# Patient Record
Sex: MALE | Race: OTHER | Employment: OTHER | URBAN - METROPOLITAN AREA
[De-identification: names, ages, dates, MRNs, and addresses within clinical notes are randomized per-mention and may not be internally consistent; named-entity substitution may affect disease eponyms.]

---

## 2017-01-14 PROCEDURE — 84165 PROTEIN E-PHORESIS SERUM: CPT | Performed by: INTERNAL MEDICINE

## 2017-01-14 PROCEDURE — 86334 IMMUNOFIX E-PHORESIS SERUM: CPT | Performed by: INTERNAL MEDICINE

## 2017-01-14 PROCEDURE — 83883 ASSAY NEPHELOMETRY NOT SPEC: CPT | Performed by: INTERNAL MEDICINE

## 2017-01-14 PROCEDURE — 36415 COLL VENOUS BLD VENIPUNCTURE: CPT | Performed by: INTERNAL MEDICINE

## 2017-06-22 PROBLEM — M17.12 PRIMARY OSTEOARTHRITIS OF LEFT KNEE: Status: ACTIVE | Noted: 2017-06-22

## 2017-06-22 PROBLEM — M17.11 PRIMARY OSTEOARTHRITIS OF RIGHT KNEE: Status: ACTIVE | Noted: 2017-06-22

## 2017-08-07 ENCOUNTER — HOSPITAL ENCOUNTER (OUTPATIENT)
Dept: PHYSICAL THERAPY | Facility: HOSPITAL | Age: 77
Discharge: HOME | End: 2017-08-07
Attending: ORTHOPAEDIC SURGERY
Payer: MEDICARE

## 2017-08-07 ENCOUNTER — LABORATORY ENCOUNTER (OUTPATIENT)
Dept: LAB | Facility: HOSPITAL | Age: 77
End: 2017-08-07
Attending: ORTHOPAEDIC SURGERY
Payer: MEDICARE

## 2017-08-07 DIAGNOSIS — Z01.818 PREOP TESTING: ICD-10-CM

## 2017-08-07 LAB
ANTIBODY SCREEN: NEGATIVE
BASOPHILS # BLD AUTO: 0.06 X10(3) UL (ref 0–0.1)
BASOPHILS NFR BLD AUTO: 0.8 %
BUN BLD-MCNC: 11 MG/DL (ref 8–20)
CALCIUM BLD-MCNC: 10.1 MG/DL (ref 8.3–10.3)
CHLORIDE: 105 MMOL/L (ref 101–111)
CO2: 25 MMOL/L (ref 22–32)
CREAT BLD-MCNC: 0.98 MG/DL (ref 0.7–1.3)
EOSINOPHIL # BLD AUTO: 0.24 X10(3) UL (ref 0–0.3)
EOSINOPHIL NFR BLD AUTO: 3.3 %
ERYTHROCYTE [DISTWIDTH] IN BLOOD BY AUTOMATED COUNT: 12.9 % (ref 11.5–16)
GLUCOSE BLD-MCNC: 89 MG/DL (ref 70–99)
HCT VFR BLD AUTO: 42.4 % (ref 37–53)
HGB BLD-MCNC: 14 G/DL (ref 13–17)
IMMATURE GRANULOCYTE COUNT: 0.01 X10(3) UL (ref 0–1)
IMMATURE GRANULOCYTE RATIO %: 0.1 %
LYMPHOCYTES # BLD AUTO: 2.19 X10(3) UL (ref 0.9–4)
LYMPHOCYTES NFR BLD AUTO: 30.5 %
MCH RBC QN AUTO: 28.4 PG (ref 27–33.2)
MCHC RBC AUTO-ENTMCNC: 33 G/DL (ref 31–37)
MCV RBC AUTO: 86 FL (ref 80–99)
MONOCYTES # BLD AUTO: 0.64 X10(3) UL (ref 0.1–0.6)
MONOCYTES NFR BLD AUTO: 8.9 %
NEUTROPHIL ABS PRELIM: 4.03 X10 (3) UL (ref 1.3–6.7)
NEUTROPHILS # BLD AUTO: 4.03 X10(3) UL (ref 1.3–6.7)
NEUTROPHILS NFR BLD AUTO: 56.4 %
PLATELET # BLD AUTO: 338 10(3)UL (ref 150–450)
POTASSIUM SERPL-SCNC: 4.2 MMOL/L (ref 3.6–5.1)
RBC # BLD AUTO: 4.93 X10(6)UL (ref 3.8–5.8)
RED CELL DISTRIBUTION WIDTH-SD: 40.4 FL (ref 35.1–46.3)
RH BLOOD TYPE: POSITIVE
SODIUM SERPL-SCNC: 138 MMOL/L (ref 136–144)
WBC # BLD AUTO: 7.2 X10(3) UL (ref 4–13)

## 2017-08-07 PROCEDURE — 36415 COLL VENOUS BLD VENIPUNCTURE: CPT

## 2017-08-07 PROCEDURE — 86901 BLOOD TYPING SEROLOGIC RH(D): CPT

## 2017-08-07 PROCEDURE — 85025 COMPLETE CBC W/AUTO DIFF WBC: CPT

## 2017-08-07 PROCEDURE — 87081 CULTURE SCREEN ONLY: CPT

## 2017-08-07 PROCEDURE — 86850 RBC ANTIBODY SCREEN: CPT

## 2017-08-07 PROCEDURE — 86900 BLOOD TYPING SEROLOGIC ABO: CPT

## 2017-08-07 PROCEDURE — 80048 BASIC METABOLIC PNL TOTAL CA: CPT

## 2017-08-08 PROCEDURE — 83883 ASSAY NEPHELOMETRY NOT SPEC: CPT | Performed by: INTERNAL MEDICINE

## 2017-08-08 PROCEDURE — 84165 PROTEIN E-PHORESIS SERUM: CPT | Performed by: INTERNAL MEDICINE

## 2017-08-08 PROCEDURE — 86334 IMMUNOFIX E-PHORESIS SERUM: CPT | Performed by: INTERNAL MEDICINE

## 2017-09-07 NOTE — OR NURSING
Spoke to Berenice in Dr. Sven Frey office regarding MRSA culture was done 8/7/17 and is over the 30 days and she stated Dr. Alice Ahmadi would like for it to be repeated prior to surgery on 10/11/17.

## 2017-09-21 ENCOUNTER — ANESTHESIA EVENT (OUTPATIENT)
Dept: SURGERY | Facility: HOSPITAL | Age: 77
DRG: 470 | End: 2017-09-21
Payer: MEDICARE

## 2017-10-04 ENCOUNTER — LABORATORY ENCOUNTER (OUTPATIENT)
Dept: LAB | Facility: HOSPITAL | Age: 77
End: 2017-10-04
Attending: ORTHOPAEDIC SURGERY
Payer: MEDICARE

## 2017-10-04 DIAGNOSIS — M17.12 PRIMARY OSTEOARTHRITIS OF LEFT KNEE: ICD-10-CM

## 2017-10-04 PROCEDURE — 86850 RBC ANTIBODY SCREEN: CPT

## 2017-10-04 PROCEDURE — 87081 CULTURE SCREEN ONLY: CPT

## 2017-10-04 PROCEDURE — 36415 COLL VENOUS BLD VENIPUNCTURE: CPT

## 2017-10-04 PROCEDURE — 86900 BLOOD TYPING SEROLOGIC ABO: CPT

## 2017-10-04 PROCEDURE — 85730 THROMBOPLASTIN TIME PARTIAL: CPT

## 2017-10-04 PROCEDURE — 86901 BLOOD TYPING SEROLOGIC RH(D): CPT

## 2017-10-04 PROCEDURE — 80076 HEPATIC FUNCTION PANEL: CPT

## 2017-10-04 PROCEDURE — 85610 PROTHROMBIN TIME: CPT

## 2017-10-11 ENCOUNTER — HOSPITAL ENCOUNTER (INPATIENT)
Facility: HOSPITAL | Age: 77
LOS: 2 days | Discharge: HOME HEALTH | DRG: 470 | End: 2017-10-13
Attending: ORTHOPAEDIC SURGERY | Admitting: ORTHOPAEDIC SURGERY
Payer: MEDICARE

## 2017-10-11 ENCOUNTER — SURGERY (OUTPATIENT)
Age: 77
End: 2017-10-11

## 2017-10-11 ENCOUNTER — ANESTHESIA (OUTPATIENT)
Dept: SURGERY | Facility: HOSPITAL | Age: 77
DRG: 470 | End: 2017-10-11
Payer: MEDICARE

## 2017-10-11 ENCOUNTER — APPOINTMENT (OUTPATIENT)
Dept: GENERAL RADIOLOGY | Facility: HOSPITAL | Age: 77
DRG: 470 | End: 2017-10-11
Attending: PHYSICIAN ASSISTANT
Payer: MEDICARE

## 2017-10-11 DIAGNOSIS — M17.12 PRIMARY OSTEOARTHRITIS OF LEFT KNEE: Primary | ICD-10-CM

## 2017-10-11 PROCEDURE — 76942 ECHO GUIDE FOR BIOPSY: CPT | Performed by: ORTHOPAEDIC SURGERY

## 2017-10-11 PROCEDURE — 85730 THROMBOPLASTIN TIME PARTIAL: CPT

## 2017-10-11 PROCEDURE — 3E0T3BZ INTRODUCTION OF ANESTHETIC AGENT INTO PERIPHERAL NERVES AND PLEXI, PERCUTANEOUS APPROACH: ICD-10-PCS | Performed by: ANESTHESIOLOGY

## 2017-10-11 PROCEDURE — 0SRD0J9 REPLACEMENT OF LEFT KNEE JOINT WITH SYNTHETIC SUBSTITUTE, CEMENTED, OPEN APPROACH: ICD-10-PCS | Performed by: ORTHOPAEDIC SURGERY

## 2017-10-11 PROCEDURE — 88305 TISSUE EXAM BY PATHOLOGIST: CPT | Performed by: ORTHOPAEDIC SURGERY

## 2017-10-11 PROCEDURE — 73560 X-RAY EXAM OF KNEE 1 OR 2: CPT | Performed by: PHYSICIAN ASSISTANT

## 2017-10-11 PROCEDURE — 88311 DECALCIFY TISSUE: CPT | Performed by: ORTHOPAEDIC SURGERY

## 2017-10-11 DEVICE — ATTUNE KNEE SYSTEM TIBIAL INSERT ROTATING PLATFORM POSTERIOR STABILIZED 5 6MM AOX
Type: IMPLANTABLE DEVICE | Site: KNEE | Status: FUNCTIONAL
Brand: ATTUNE

## 2017-10-11 DEVICE — ATTUNE KNEE SYSTEM TIBIAL BASE ROTATING PLATFORM SIZE 4 CEMENTED
Type: IMPLANTABLE DEVICE | Site: KNEE | Status: FUNCTIONAL
Brand: ATTUNE

## 2017-10-11 DEVICE — ATTUNE PINNING SYSTEM
Type: IMPLANTABLE DEVICE | Site: KNEE | Status: FUNCTIONAL
Brand: ATTUNE

## 2017-10-11 DEVICE — CEMENT BONE RADIOPAQ HOWMEDICA: Type: IMPLANTABLE DEVICE | Site: KNEE | Status: FUNCTIONAL

## 2017-10-11 DEVICE — ATTUNE KNEE SYSTEM FEMORAL POSTERIOR STABILIZED SIZE 5 LEFT CEMENTED
Type: IMPLANTABLE DEVICE | Site: KNEE | Status: FUNCTIONAL
Brand: ATTUNE

## 2017-10-11 RX ORDER — OXYCODONE HYDROCHLORIDE 10 MG/1
10 TABLET ORAL EVERY 4 HOURS PRN
Status: DISPENSED | OUTPATIENT
Start: 2017-10-11 | End: 2017-10-13

## 2017-10-11 RX ORDER — ACETAMINOPHEN 325 MG/1
TABLET ORAL
Status: COMPLETED
Start: 2017-10-11 | End: 2017-10-11

## 2017-10-11 RX ORDER — HYDROMORPHONE HYDROCHLORIDE 1 MG/ML
0.8 INJECTION, SOLUTION INTRAMUSCULAR; INTRAVENOUS; SUBCUTANEOUS EVERY 2 HOUR PRN
Status: ACTIVE | OUTPATIENT
Start: 2017-10-11 | End: 2017-10-13

## 2017-10-11 RX ORDER — CETIRIZINE HYDROCHLORIDE 10 MG/1
10 TABLET ORAL EVERY EVENING
Status: DISCONTINUED | OUTPATIENT
Start: 2017-10-11 | End: 2017-10-13

## 2017-10-11 RX ORDER — CELECOXIB 200 MG/1
200 CAPSULE ORAL DAILY
Qty: 30 CAPSULE | Refills: 0 | Status: SHIPPED | OUTPATIENT
Start: 2017-10-11 | End: 2018-06-12

## 2017-10-11 RX ORDER — BUPIVACAINE HYDROCHLORIDE AND EPINEPHRINE 2.5; 5 MG/ML; UG/ML
INJECTION, SOLUTION EPIDURAL; INFILTRATION; INTRACAUDAL; PERINEURAL AS NEEDED
Status: DISCONTINUED | OUTPATIENT
Start: 2017-10-11 | End: 2017-10-11 | Stop reason: HOSPADM

## 2017-10-11 RX ORDER — SENNOSIDES 8.6 MG
17.2 TABLET ORAL NIGHTLY
Status: DISCONTINUED | OUTPATIENT
Start: 2017-10-11 | End: 2017-10-13

## 2017-10-11 RX ORDER — OXYCODONE HYDROCHLORIDE 5 MG/1
5 TABLET ORAL EVERY 4 HOURS PRN
Status: DISPENSED | OUTPATIENT
Start: 2017-10-11 | End: 2017-10-13

## 2017-10-11 RX ORDER — MEPERIDINE HYDROCHLORIDE 25 MG/ML
12.5 INJECTION INTRAMUSCULAR; INTRAVENOUS; SUBCUTANEOUS AS NEEDED
Status: DISCONTINUED | OUTPATIENT
Start: 2017-10-11 | End: 2017-10-11 | Stop reason: HOSPADM

## 2017-10-11 RX ORDER — METOCLOPRAMIDE HYDROCHLORIDE 5 MG/ML
10 INJECTION INTRAMUSCULAR; INTRAVENOUS EVERY 6 HOURS PRN
Status: ACTIVE | OUTPATIENT
Start: 2017-10-11 | End: 2017-10-13

## 2017-10-11 RX ORDER — CYCLOBENZAPRINE HCL 5 MG
5 TABLET ORAL 3 TIMES DAILY PRN
Status: DISCONTINUED | OUTPATIENT
Start: 2017-10-11 | End: 2017-10-13

## 2017-10-11 RX ORDER — HYDROMORPHONE HYDROCHLORIDE 1 MG/ML
0.4 INJECTION, SOLUTION INTRAMUSCULAR; INTRAVENOUS; SUBCUTANEOUS EVERY 5 MIN PRN
Status: DISCONTINUED | OUTPATIENT
Start: 2017-10-11 | End: 2017-10-11 | Stop reason: HOSPADM

## 2017-10-11 RX ORDER — DIPHENHYDRAMINE HYDROCHLORIDE 50 MG/ML
25 INJECTION INTRAMUSCULAR; INTRAVENOUS ONCE AS NEEDED
Status: ACTIVE | OUTPATIENT
Start: 2017-10-11 | End: 2017-10-11

## 2017-10-11 RX ORDER — ONDANSETRON 2 MG/ML
4 INJECTION INTRAMUSCULAR; INTRAVENOUS AS NEEDED
Status: DISCONTINUED | OUTPATIENT
Start: 2017-10-11 | End: 2017-10-11 | Stop reason: HOSPADM

## 2017-10-11 RX ORDER — MIDAZOLAM HYDROCHLORIDE 1 MG/ML
1 INJECTION INTRAMUSCULAR; INTRAVENOUS EVERY 5 MIN PRN
Status: DISCONTINUED | OUTPATIENT
Start: 2017-10-11 | End: 2017-10-11 | Stop reason: HOSPADM

## 2017-10-11 RX ORDER — ALENDRONATE SODIUM 70 MG/1
70 TABLET ORAL WEEKLY
COMMUNITY
End: 2017-10-13

## 2017-10-11 RX ORDER — POLYETHYLENE GLYCOL 3350 17 G/17G
17 POWDER, FOR SOLUTION ORAL DAILY PRN
Status: DISCONTINUED | OUTPATIENT
Start: 2017-10-11 | End: 2017-10-13

## 2017-10-11 RX ORDER — SODIUM PHOSPHATE, DIBASIC AND SODIUM PHOSPHATE, MONOBASIC 7; 19 G/133ML; G/133ML
1 ENEMA RECTAL ONCE AS NEEDED
Status: DISCONTINUED | OUTPATIENT
Start: 2017-10-11 | End: 2017-10-13

## 2017-10-11 RX ORDER — OXYCODONE HYDROCHLORIDE AND ACETAMINOPHEN 5; 325 MG/1; MG/1
1 TABLET ORAL EVERY 4 HOURS PRN
Qty: 80 TABLET | Refills: 0 | Status: SHIPPED | OUTPATIENT
Start: 2017-10-11 | End: 2017-10-23

## 2017-10-11 RX ORDER — DIPHENHYDRAMINE HYDROCHLORIDE 50 MG/ML
12.5 INJECTION INTRAMUSCULAR; INTRAVENOUS EVERY 4 HOURS PRN
Status: DISCONTINUED | OUTPATIENT
Start: 2017-10-11 | End: 2017-10-13

## 2017-10-11 RX ORDER — SODIUM CHLORIDE, SODIUM LACTATE, POTASSIUM CHLORIDE, CALCIUM CHLORIDE 600; 310; 30; 20 MG/100ML; MG/100ML; MG/100ML; MG/100ML
INJECTION, SOLUTION INTRAVENOUS CONTINUOUS
Status: DISCONTINUED | OUTPATIENT
Start: 2017-10-11 | End: 2017-10-13

## 2017-10-11 RX ORDER — ONDANSETRON 2 MG/ML
4 INJECTION INTRAMUSCULAR; INTRAVENOUS EVERY 4 HOURS PRN
Status: DISCONTINUED | OUTPATIENT
Start: 2017-10-11 | End: 2017-10-13

## 2017-10-11 RX ORDER — CEFAZOLIN SODIUM 1 G/3ML
INJECTION, POWDER, FOR SOLUTION INTRAMUSCULAR; INTRAVENOUS
Status: DISCONTINUED | OUTPATIENT
Start: 2017-10-11 | End: 2017-10-11 | Stop reason: HOSPADM

## 2017-10-11 RX ORDER — DOCUSATE SODIUM 100 MG/1
100 CAPSULE, LIQUID FILLED ORAL 2 TIMES DAILY
Qty: 60 CAPSULE | Refills: 0 | Status: SHIPPED | OUTPATIENT
Start: 2017-10-11 | End: 2017-11-10 | Stop reason: ALTCHOICE

## 2017-10-11 RX ORDER — HYDROMORPHONE HYDROCHLORIDE 1 MG/ML
0.4 INJECTION, SOLUTION INTRAMUSCULAR; INTRAVENOUS; SUBCUTANEOUS EVERY 2 HOUR PRN
Status: DISPENSED | OUTPATIENT
Start: 2017-10-11 | End: 2017-10-13

## 2017-10-11 RX ORDER — HYDROXYZINE PAMOATE 25 MG/1
25 CAPSULE ORAL 3 TIMES DAILY PRN
Qty: 60 CAPSULE | Refills: 0 | Status: SHIPPED | OUTPATIENT
Start: 2017-10-11 | End: 2018-07-09

## 2017-10-11 RX ORDER — ACETAMINOPHEN 325 MG/1
650 TABLET ORAL 4 TIMES DAILY
Status: DISPENSED | OUTPATIENT
Start: 2017-10-11 | End: 2017-10-13

## 2017-10-11 RX ORDER — DIPHENHYDRAMINE HCL 25 MG
25 CAPSULE ORAL EVERY 4 HOURS PRN
Status: DISCONTINUED | OUTPATIENT
Start: 2017-10-11 | End: 2017-10-13

## 2017-10-11 RX ORDER — MELATONIN
325
Status: DISCONTINUED | OUTPATIENT
Start: 2017-10-12 | End: 2017-10-13

## 2017-10-11 RX ORDER — OXYCODONE HCL 10 MG/1
10 TABLET, FILM COATED, EXTENDED RELEASE ORAL
Status: COMPLETED | OUTPATIENT
Start: 2017-10-11 | End: 2017-10-11

## 2017-10-11 RX ORDER — OXYCODONE HYDROCHLORIDE 15 MG/1
15 TABLET ORAL EVERY 4 HOURS PRN
Status: DISPENSED | OUTPATIENT
Start: 2017-10-11 | End: 2017-10-13

## 2017-10-11 RX ORDER — METOCLOPRAMIDE HYDROCHLORIDE 5 MG/ML
10 INJECTION INTRAMUSCULAR; INTRAVENOUS AS NEEDED
Status: DISCONTINUED | OUTPATIENT
Start: 2017-10-11 | End: 2017-10-11 | Stop reason: HOSPADM

## 2017-10-11 RX ORDER — SODIUM CHLORIDE 9 MG/ML
INJECTION, SOLUTION INTRAVENOUS CONTINUOUS
Status: DISCONTINUED | OUTPATIENT
Start: 2017-10-11 | End: 2017-10-13

## 2017-10-11 RX ORDER — ASPIRIN 81 MG/1
81 TABLET ORAL DAILY
Status: DISCONTINUED | OUTPATIENT
Start: 2017-10-12 | End: 2017-10-12

## 2017-10-11 RX ORDER — DOCUSATE SODIUM 100 MG/1
100 CAPSULE, LIQUID FILLED ORAL 2 TIMES DAILY
Status: DISCONTINUED | OUTPATIENT
Start: 2017-10-11 | End: 2017-10-13

## 2017-10-11 RX ORDER — NALOXONE HYDROCHLORIDE 0.4 MG/ML
80 INJECTION, SOLUTION INTRAMUSCULAR; INTRAVENOUS; SUBCUTANEOUS AS NEEDED
Status: DISCONTINUED | OUTPATIENT
Start: 2017-10-11 | End: 2017-10-11 | Stop reason: HOSPADM

## 2017-10-11 RX ORDER — BISACODYL 10 MG
10 SUPPOSITORY, RECTAL RECTAL
Status: DISCONTINUED | OUTPATIENT
Start: 2017-10-11 | End: 2017-10-13

## 2017-10-11 RX ORDER — HYDROMORPHONE HYDROCHLORIDE 1 MG/ML
0.2 INJECTION, SOLUTION INTRAMUSCULAR; INTRAVENOUS; SUBCUTANEOUS EVERY 2 HOUR PRN
Status: DISPENSED | OUTPATIENT
Start: 2017-10-11 | End: 2017-10-13

## 2017-10-11 RX ORDER — ACETAMINOPHEN 325 MG/1
650 TABLET ORAL ONCE
Status: COMPLETED | OUTPATIENT
Start: 2017-10-11 | End: 2017-10-11

## 2017-10-11 NOTE — CONSULTS
[facility] Hospitalist H&P       CC: medicine consult for comanagement of medical conditions     PCP: Elba Lam MD    History of Present Illness: Patient is a 77year old male with PMH sig for HTN, HL, IFG and OA is admitted for planned L TKR.   [facility]  No date: HEMORRHOIDECTOMY  No date: HERNIA SURGERY      Comment: Bilateral  4/26/2011: NASAL SCOPY,OPEN MAXILL SINUS      Comment: Performed by Alexsandra Diaz at [address] [place] [address],[address]  4/26/2011: [address] daily. Disp:  Rfl:    AmLODIPine Besylate 5 MG Oral Tab Take 1 tablet (5 mg total) by mouth daily. Disp: 90 tablet Rfl: 3   [DISCONTINUED] ALENDRONATE SODIUM 70 MG Oral Tab TAKE 1 TABLET BY MOUTH EVERY 7 DAYS.  TAKE WITH FULL GLASS OF WATER AND REMAIN Tray Fears hours.    Invalid input(s): LYM#, MONO#, BASOS#, EOSIN#    No results for input(s): NA, K, CL, CO2, BUN, CREATSERUM, GLU, CA, CAION, MG, PHOS in the last 72 hours. No results for input(s): ALT, AST, ALB, AMYLASE, LIPASE, LDH in the last 72 hours.     Inv Hospitalist  Pager [phone]  Answering Service number: [phone]

## 2017-10-11 NOTE — PLAN OF CARE
Patient/Family Long Term Goal Progressing      Patient/Family Short Term Goal Progressing      Incision(s), wounds(s) or drain site(s) healing without S/S of infection Progressing      Patient received from PACU at 12:45  Son Erick Hernandez at bedside    Emily Peck

## 2017-10-11 NOTE — H&P
Office Visit     8/8/2017  [facility] INTERNAL MEDICINE - Shawn Agustin MD   Internal Medicine   Preoperative examination +4 more   Dx   Pre-Op Exam    Reason for Visit    Reason for Visit     Pre-Op Exam left knee surgery on 8/21/2017 at E Location: [address] [place] [address]  7/11/2015: Marlee Perry N/A      Comment: Procedure: COLONOSCOPY, POSSIBLE BIOPSY,                POSSIBLE POLYPECTOMY 20256;  Surgeon: Yohana Rosario MD;  Location: [facility] No Known Allergies  Current Meds:     Current Outpatient Prescriptions:  apixaban 2.5 MG Oral Tab Take 1 tablet (2.5 mg total) by mouth 2 (two) times daily. Disp: 24 tablet Rfl: 0   ALENDRONATE SODIUM 70 MG Oral Tab TAKE 1 TABLET BY MOUTH EVERY 7 DAYS.  PREM 80.0 - 99.0 fL 86.0   MCH      27.0 - 33.2 pg 28.4   MCHC      31.0 - 37.0 g/dL 33.0   RDW      11.5 - 16.0 % 12.9   RDW-SD      35.1 - 46.3 fL 40.4   Prelim Neutrophil Abs      1.30 - 6.70 x10 (3) uL 4.03   Neutrophils Absolute      1.30 - 6.70 x10(3) - METS>4  - normal ECG  - low risk candidate for surgery, noted elevated protein level, will need evaluation prior to surgery.  Note sent to Dr. Theodora Rehman  - ELECTROCARDIOGRAM, COMPLETE  - OFFICE CONSULTATION,LEVEL IV     2. Osteoarthritis of both knees, unsp

## 2017-10-11 NOTE — H&P
[address] Patient Status:  Surgery Admit    1940 MRN [id number]   Location [address] [place] [address] Attending Arnie Nicholas MD   [facility] Day # 0 PCP Ambrosio Johansen MD     History of Present Illn LLC  4/26/2011: EXCISION TURBINATE,SUBMUCOUS      Comment: Performed by Joshua Gamez at [address]  No date: HEMORRHOIDECTOMY  No date: HERNIA SURGERY      Comment: Bilateral  4/26/2011: [address] [facility] 9/27/2017   triamcinolone acetonide 0.1 % External Cream Apply topically 2 (two) times daily.  Disp: 453.5 g Rfl: 1 10/8/2017   Levocetirizine Dihydrochloride 5 MG Oral Tab Take 1 tab daily Disp: 30 tablet Rfl: 2 9/27/2017       Review of Systems:  A compre

## 2017-10-11 NOTE — ANESTHESIA POSTPROCEDURE EVALUATION
[address] [facility] Patient Status:  Surgery Admit   Age/Gender 77year old male MRN [id number]   Location [address] [facility] Attending Mc Pelaez MD   Hosp Day # 0 PCP Nadine Jackson MD       Anesthesia Post-o

## 2017-10-11 NOTE — ANESTHESIA PREPROCEDURE EVALUATION
PRE-OP EVALUATION    Patient Name: Raegan Serna    Pre-op Diagnosis: LEFT KNEE OSTEOARTHRITIS    Procedure(s):  LEFT TOTAL KNEE REPLACEMENT    Surgeon(s) and Role:     Jenna Carreon MD - Primary    Pre-op vitals reviewed. Body mass index is 22. 32 Procedure: COLONOSCOPY, POSSIBLE BIOPSY,                POSSIBLE POLYPECTOMY 33149;  Surgeon: Catherine Agarwal MD;  Location: [address] [place] [address]  4/26/2011: EXCISION TURBINATE,SUBMUCOUS      Comment: Performed by Baldemar Michaels  08/07/2017   CO2 25.0 08/07/2017   BUN 11 08/07/2017   CREATSERUM 0.98 08/07/2017   GLU 89 08/07/2017   CA 10.1 08/07/2017       Lab Results  Component Value Date   INR 0.98 10/04/2017         Airway      Mallampati: I  Mouth opening: 3 FB  TM dis

## 2017-10-11 NOTE — PHYSICAL THERAPY NOTE
Order received for PT eval. Attempted to see Pt x 2 this afternoon, however, Pt continues to present with L LE numbness. Pt not currently appropriate for therapy. Will follow up 10/12/17.

## 2017-10-12 PROCEDURE — 85027 COMPLETE CBC AUTOMATED: CPT | Performed by: ORTHOPAEDIC SURGERY

## 2017-10-12 PROCEDURE — 97535 SELF CARE MNGMENT TRAINING: CPT

## 2017-10-12 PROCEDURE — 97165 OT EVAL LOW COMPLEX 30 MIN: CPT

## 2017-10-12 PROCEDURE — 97150 GROUP THERAPEUTIC PROCEDURES: CPT

## 2017-10-12 PROCEDURE — 97116 GAIT TRAINING THERAPY: CPT

## 2017-10-12 PROCEDURE — 97162 PT EVAL MOD COMPLEX 30 MIN: CPT

## 2017-10-12 RX ORDER — HYDROCODONE BITARTRATE AND ACETAMINOPHEN 10; 325 MG/1; MG/1
1 TABLET ORAL EVERY 4 HOURS PRN
Status: DISCONTINUED | OUTPATIENT
Start: 2017-10-13 | End: 2017-10-13

## 2017-10-12 RX ORDER — AMLODIPINE BESYLATE 5 MG/1
5 TABLET ORAL DAILY
Status: DISCONTINUED | OUTPATIENT
Start: 2017-10-12 | End: 2017-10-13

## 2017-10-12 RX ORDER — HYDROCODONE BITARTRATE AND ACETAMINOPHEN 10; 325 MG/1; MG/1
2 TABLET ORAL EVERY 4 HOURS PRN
Status: DISCONTINUED | OUTPATIENT
Start: 2017-10-13 | End: 2017-10-13

## 2017-10-12 NOTE — PHYSICAL THERAPY NOTE
PHYSICAL THERAPY KNEE EVALUATION - INPATIENT     Room Number: 364/364-A  Evaluation Date: 10/12/2017  Type of Evaluation: Initial  Physician Order: PT Eval and Treat    Presenting Problem: s/p left TKA 10/11/17  Reason for Therapy: [facility]  No date: HEMORRHOIDECTOMY  No date: HERNIA SURGERY      Comment: Bilateral  4/26/2011: NASAL SCOPY,OPEN MAXILL SINUS      Comment: Performed by Slime Stevenson at [address] [place] [address],[address]  4/26/2011: [address] ASSESSMENT  Ratin (appears higher, pt reluctant to give higher number)  Location: left knee  Management Techniques:  Activity promotion;Repositioning    COGNITION  · Overall Cognitive Status:  WFL - within functional limits    RANGE OF MOTION AND STRENG son present. Pt educated in role of PT, group therapy, ankle pumps, goals for session. Pt transferred sit to stand to rw wbat left with cga, pt stating \"let me do it myself\".   Pt amb into and out of restroom with rw with gait training emphasis on postu of function. DISCHARGE RECOMMENDATIONS  PT Discharge Recommendations: Outpatient PT; Home;24 hour care/supervision    PLAN  PT Treatment Plan: Bed mobility; Endurance; Patient education; Family education;Gait training;Range of motion;Strengthening;Stair train

## 2017-10-12 NOTE — CM/SW NOTE
10/12/17 1600   CM/SW Referral Data   Referral Source Physician   Reason for Referral Discharge planning   Informant Patient;Edward Staff   Pertinent Medical Hx   Primary Care Physician Name DOMINIC Lock   Patient Info   Patient's Mental Status Alert;O Pt will need order for OPPT from MD.

## 2017-10-12 NOTE — PLAN OF CARE
Patient/Family Goals    • Patient/Family Long Term Goal Progressing    • Patient/Family Short Term Goal Progressing        SKIN/TISSUE INTEGRITY - ADULT    • Incision(s), wounds(s) or drain site(s) healing without S/S of infection Progressing        Pt A &

## 2017-10-12 NOTE — PROGRESS NOTES
POD #1 Joint newsletter given to patient and son. Patient speaking/understanding some English but requested son to translate. Reviewed indications, side effects of pain medication/narcotics and constipation prevention.  Stressed importance of increased flui

## 2017-10-12 NOTE — PROGRESS NOTES
Orthopedic surgery progress note    Anyi Duran Patient Status:  Inpatient    1940 MRN [id number]   [facility] 3SW-A Attending Janet Gardner MD   [facility] Day # 1 PCP Maximo Haider MD       Subjective:  No major complaints.   No

## 2017-10-12 NOTE — CERTIFICATION
**Certification    PHYSICIAN Certification of Need for Inpatient Hospitalization    Based on the his current state of illness, Wylisa Faustino requires inpatient hospitalization for his surgery

## 2017-10-12 NOTE — PHYSICAL THERAPY NOTE
PHYSICAL THERAPY KNEE TREATMENT NOTE - INPATIENT     Room Number: 364/364-A     Session: 1 and 2   Number of Visits to Meet Established Goals: 4    Presenting Problem: s/p left TKA 10/11/17    Problem List  Active Problems:    Primary osteoarthritis of le [place]  4/26/2011: NASAL SCOPY,OPEN MAXILL SINUS      Comment: Performed by Lane Arredondo at [address] [place] [address],[address]  4/26/2011: NASAL SCOPY,REMV PART ETHMOID      Comment: Performed by Lane Arredondo at [place] [address] hospital room?: A Little   -   Climbing 3-5 steps with a railing?: A Little       AM-PAC Score:  Raw Score: 18   PT Approx Degree of Impairment Score: 46.58%   Standardized Score (AM-PAC Scale): 43.63   CMS Modifier (G-Code): CK    FUNCTIONAL ABILITY STATU 15    Patient End of Session: Up in chair; With [facility] staff;Needs met    ASSESSMENT     Pt seen BID in PT group for gait training, stair/curb step training, and BLE strengthening: S/P L TKA on 10/11/17. Pt is progressing well.     Results of the AM-PAC \"6 clic

## 2017-10-12 NOTE — PROGRESS NOTES
Operative leg measured for tubigrip. Size E applied to left calf and size F applied to left knee. Patient instructed;  utilized as son not at bedside. Patient verbalized understanding.

## 2017-10-12 NOTE — OPERATIVE REPORT
TOTAL KNEE OPERATIVE REPORT:  Anyi Duran       [id number]     12/24/1940    DATE OF SURGERY 10/11/17    PREOP DX: LEFT  KNEE PRIMARY OSTEOARTHRITIS  POSTOP DX:LEFT KNEE PRIMARY OSTEOARTHRITIS  PROCEDURE: LEFT TOTAL KNEE REPLACEMENT  SURGEON: Roger Chinchilla WAS STABLE. FEMUR WAS FINISHED OFF WITH CHAMFER AND [address] CUTS IN USUAL FASHION. POSTERIOR FEMORAL OSTEOPHYTES WERE REMOVED. POSTERIOR CAPSULAR RELEASE WAS DONE CAREFULLY. PROXIMAL TIBIA WAS EXPOSED.   TIBIA WAS SIZED at 4 AND ROTATION WAS SET USING

## 2017-10-12 NOTE — OCCUPATIONAL THERAPY NOTE
OCCUPATIONAL THERAPY EVALUATION - INPATIENT     Room Number: 364/364-A  Evaluation Date: 10/12/2017  Type of Evaluation: Initial  Presenting Problem:  (L TKA)    Physician Order: IP Consult to Occupational Therapy  Reason for Therapy: ADL/IADL Dysfunction Geovany  No date: HEMORRHOIDECTOMY  No date: HERNIA SURGERY      Comment: Bilateral  4/26/2011: NASAL SCOPY,OPEN MAXILL SINUS      Comment: Performed by Jenna Redd at [address] [place] [address],[address]  4/26/2011: Landon Arce functional limits  Following Commands:  follows one step commands with repetition  Patient a little foggy today from pain meds per son   VISION  Current Vision: no visual deficits    PERCEPTION  Overall Perception Status:   WFL - within functional limits LLE and reach for chair prior to sitting. Patient dressed lower body with min A for threading pants and CGA to pull up over hips, set up for upper body dressing. Patient with increased pain and some fatigue post activity.  Plan for one more session for con Discharge Recommendations: Home  OT Device Recommendations: None    PLAN  OT Treatment Plan: Energy conservation/work simplification techniques;ADL training;Functional transfer training; Endurance training;Patient/Family education;Patient/Family training;Co

## 2017-10-12 NOTE — PROGRESS NOTES
[facility] hospitalist daily note  Patient was seen/examined on 10/12/17    S no chest pain, no SOB, no abd pain, no nausea/emesis today, had BM    medicaitons in EPIC    PE;   10/12/17  1206   BP: 151/66   Pulse: 72   Resp: 16   Temp: 98.5 °F (36.9 °C)     G

## 2017-10-12 NOTE — PAYOR COMM NOTE
--------------  ADMISSION REVIEW     Payor: [address] #:  [id number]  Authorization Number: [id number]     Admit date: 10/11/17  Admit time: 1216       Admitting Physician: Yvonne Ambrocio MD  Attending Physician:  Yvonne Ambrocio MD  [facility] hemorrhoids; next colonoscopy in 2015  7/11/15  Jelani Go [facility]): COLONOSCOPY      Comment: Hx polyps: 2 small polyps (adenomas), large                int hemorrhoids; no further routine colon                cancer screening  7/11/2015: Nereyda Hudson Allergies[AK. 2]    Home Medications:[AK.1]    Prescriptions Prior to Admission:  Alendronate Sodium 70 MG Oral Tab Take 70 mg by mouth once a week. Disp:  Rfl:  9/26/2017   aspirin 81 MG Oral Tab Take 81 mg by mouth daily.  Disp:  Rfl:  9/27/2017   Marilyn including but not limited to infection, DVT, PE, nerve and blood vessel injuries, arthrofibrosis, fractures, dislocations, chronic pain, blood transfusions, possible revision surgeries, anesthesia and other medical complications explained.    All questions Isabel Mcqueen RN      docusate sodium (COLACE) cap 100 mg     Date Action Dose Route User    10/12/2017 0825 Given 100 mg Oral Pau Holloway RN    10/11/2017 2105 Given 100 mg Oral Jenna Verma RN    10/11/2017 1352 Given 100 mg Oral Kenmontezeth Jewel

## 2017-10-13 VITALS
SYSTOLIC BLOOD PRESSURE: 143 MMHG | HEART RATE: 91 BPM | TEMPERATURE: 99 F | DIASTOLIC BLOOD PRESSURE: 76 MMHG | WEIGHT: 120 LBS | BODY MASS INDEX: 20.49 KG/M2 | OXYGEN SATURATION: 100 % | RESPIRATION RATE: 16 BRPM | HEIGHT: 64 IN

## 2017-10-13 PROCEDURE — 97150 GROUP THERAPEUTIC PROCEDURES: CPT

## 2017-10-13 PROCEDURE — 85027 COMPLETE CBC AUTOMATED: CPT | Performed by: ORTHOPAEDIC SURGERY

## 2017-10-13 PROCEDURE — 97116 GAIT TRAINING THERAPY: CPT

## 2017-10-13 PROCEDURE — 97530 THERAPEUTIC ACTIVITIES: CPT

## 2017-10-13 RX ORDER — OXYCODONE HYDROCHLORIDE AND ACETAMINOPHEN 5; 325 MG/1; MG/1
1 TABLET ORAL EVERY 4 HOURS PRN
Status: DISCONTINUED | OUTPATIENT
Start: 2017-10-13 | End: 2017-10-13

## 2017-10-13 NOTE — PLAN OF CARE
Impaired Activities of Daily Living    • Achieve highest/safest level of independence in self care Adequate for Discharge        Impaired Functional Mobility    • Achieve highest/safest level of mobility/gait Adequate for Discharge        SKIN/TISSUE INTEG

## 2017-10-13 NOTE — PROGRESS NOTES
Orthopedic surgery progress note    Smooth Barrera Patient Status:  Inpatient    1940 MRN [id number]   [facility] 3SW-A Attending James Mcguire MD   [facility] Day # 2 PCP Akash Lama MD       Subjective:  No major complaints.   No

## 2017-10-13 NOTE — PHYSICAL THERAPY NOTE
PHYSICAL THERAPY KNEE TREATMENT NOTE - INPATIENT     Room Number: 364/364-A     Session: 3   Number of Visits to Meet Established Goals: 4    Presenting Problem: s/p left TKA 10/11/17    Problem List  Active Problems:    Primary osteoarthritis of left kne Geovany  4/26/2011: NASAL SCOPY,OPEN MAXILL SINUS      Comment: Performed by Donnie Gallo at [address] [place] [address],[address]  4/26/2011: NASAL SCOPY,REMV PART ETHMOID      Comment: Performed by Donnie Gallo at [place] [address] room?: None   -   Climbing 3-5 steps with a railing?: A Little       AM-PAC Score:  Raw Score: 21   PT Approx Degree of Impairment Score: 28.97%   Standardized Score (AM-PAC Scale): 50.25   CMS Modifier (G-Code): SONIYA    FUNCTIONAL ABILITY STATUS  Gait Asses PT to address the activity limitations identified by the AM-PAC \"6 clicks\". At this time, Pt. presents with decreased balance, impaired strength, difficulty with gait/transfers resulting in downgrade of overall functional mobility.   Due to above defi

## 2017-10-13 NOTE — PROGRESS NOTES
Acute Pain Service    Post Op Day 2 Ortho Note    Assessed patient in chair. Patient rates pain 0/10 at rest and 6-7/10 with activity. Patient states Percocet is working well to manage pain; denies itching/nausea/dizziness.     Patient able to bear weight o

## 2017-10-13 NOTE — PROGRESS NOTES
NURSING DISCHARGE NOTE    Discharged to home with referral to [address] via wheelchair. .  Accompanied by his son. Belongings packed and sent home with patient. Discharge instruction discussed with son, he verbalized understanding.   Script

## 2017-10-13 NOTE — PROGRESS NOTES
[facility] hospitalist daily note  Patient was seen/examined on 10/13/17     S no chest pain, no SOB, no abd pain, no nausea/emesis today     medicaitons in EPIC     PE;   10/13/17  0736   BP: 143/76   Pulse: 91   Resp: 16   Temp: 98

## 2017-10-13 NOTE — PLAN OF CARE
SKIN/TISSUE INTEGRITY - ADULT    • Incision(s), wounds(s) or drain site(s) healing without S/S of infection Progressing        Left knee with Aquacel dressing in place, knee appeared slightly swollen, applied cold gel pack.   Dr. So Shelley saw patient, per ortho

## 2017-10-13 NOTE — PROGRESS NOTES
Patient and coaches (son and family) attended group discharge education class. Discharge education provided utilizing \"hip/knee replacement discharge instructions\" sheet. Teach back done. Questions solicited and answered. Tolerated activity well.

## 2017-10-13 NOTE — OCCUPATIONAL THERAPY NOTE
OCCUPATIONAL THERAPY TREATMENT NOTE - INPATIENT     Room Number: 364/364-A  Session: 1/1  Number of Visits to Meet Established Goals: 1    Presenting Problem:  (L TKA)    History related to current admission:   Patient admitted for elective L TKR on 10/11. LONDON,OPEN MAXILL SINUS      Comment: Performed by Miles Nina at [address] [place] [address]  4/26/2011: [facility] SINUS      Comment: Performed by Miles Nina at [address]  4/26/2011: VINCE 32.79%  Standardized Score (AM-PAC Scale): 44.27  CMS Modifier (G-Code): CJ    FUNCTIONAL TRANSFER ASSESSMENT  Supine to Sit : Supervision  Sit to Stand: Minimum assistance    Skilled Therapy Provided:  OT received pt in chair, he and son report that pt

## 2017-10-13 NOTE — CM/SW NOTE
Plan is for discharge today.       10/13/17 1300   Discharge disposition   Discharged to: Home-Health   Name of Facillity/Home Care/Hospice ([facility])   Discharge transportation Private car

## 2017-10-13 NOTE — CM/SW NOTE
Pt's son requested [place] [address] services for pt. SW met w/ pt and pt's son and discussed HHC. Due to pt's insurance, [address] options are limited. Pt's son had no preference in agency, SW made [facility] referrals to [facility] and [facility] via [address]. QD done.  Per pt's RN, anti

## 2017-10-21 ENCOUNTER — HOSPITAL ENCOUNTER (EMERGENCY)
Facility: HOSPITAL | Age: 77
Discharge: STILL ADMITTED OR ADMINISTRATIVE | End: 2017-10-22
Payer: MEDICARE

## 2017-10-21 ENCOUNTER — HOSPITAL ENCOUNTER (INPATIENT)
Facility: HOSPITAL | Age: 77
LOS: 2 days | Discharge: HOME HEALTH | DRG: 864 | End: 2017-10-23
Attending: EMERGENCY MEDICINE | Admitting: HOSPITALIST
Payer: MEDICARE

## 2017-10-21 ENCOUNTER — APPOINTMENT (OUTPATIENT)
Dept: CT IMAGING | Facility: HOSPITAL | Age: 77
DRG: 864 | End: 2017-10-21
Attending: EMERGENCY MEDICINE
Payer: MEDICARE

## 2017-10-21 ENCOUNTER — APPOINTMENT (OUTPATIENT)
Dept: GENERAL RADIOLOGY | Facility: HOSPITAL | Age: 77
DRG: 864 | End: 2017-10-21
Attending: EMERGENCY MEDICINE
Payer: MEDICARE

## 2017-10-21 ENCOUNTER — APPOINTMENT (OUTPATIENT)
Dept: ULTRASOUND IMAGING | Facility: HOSPITAL | Age: 77
DRG: 864 | End: 2017-10-21
Attending: EMERGENCY MEDICINE
Payer: MEDICARE

## 2017-10-21 DIAGNOSIS — E87.1 HYPONATREMIA: ICD-10-CM

## 2017-10-21 DIAGNOSIS — D64.9 ANEMIA, UNSPECIFIED TYPE: ICD-10-CM

## 2017-10-21 DIAGNOSIS — A41.9 SEPSIS, DUE TO UNSPECIFIED ORGANISM: Primary | ICD-10-CM

## 2017-10-21 PROCEDURE — 96366 THER/PROPH/DIAG IV INF ADDON: CPT

## 2017-10-21 PROCEDURE — 84484 ASSAY OF TROPONIN QUANT: CPT | Performed by: EMERGENCY MEDICINE

## 2017-10-21 PROCEDURE — 71010 XR CHEST AP PORTABLE  (CPT=71010): CPT | Performed by: EMERGENCY MEDICINE

## 2017-10-21 PROCEDURE — 87205 SMEAR GRAM STAIN: CPT | Performed by: EMERGENCY MEDICINE

## 2017-10-21 PROCEDURE — 87070 CULTURE OTHR SPECIMN AEROBIC: CPT | Performed by: EMERGENCY MEDICINE

## 2017-10-21 PROCEDURE — 85610 PROTHROMBIN TIME: CPT | Performed by: EMERGENCY MEDICINE

## 2017-10-21 PROCEDURE — 85730 THROMBOPLASTIN TIME PARTIAL: CPT | Performed by: EMERGENCY MEDICINE

## 2017-10-21 PROCEDURE — 71275 CT ANGIOGRAPHY CHEST: CPT | Performed by: EMERGENCY MEDICINE

## 2017-10-21 PROCEDURE — 99285 EMERGENCY DEPT VISIT HI MDM: CPT

## 2017-10-21 PROCEDURE — 93005 ELECTROCARDIOGRAM TRACING: CPT

## 2017-10-21 PROCEDURE — 83605 ASSAY OF LACTIC ACID: CPT | Performed by: EMERGENCY MEDICINE

## 2017-10-21 PROCEDURE — 0S9D3ZX DRAINAGE OF LEFT KNEE JOINT, PERCUTANEOUS APPROACH, DIAGNOSTIC: ICD-10-PCS | Performed by: ORTHOPAEDIC SURGERY

## 2017-10-21 PROCEDURE — 87040 BLOOD CULTURE FOR BACTERIA: CPT | Performed by: EMERGENCY MEDICINE

## 2017-10-21 PROCEDURE — 93010 ELECTROCARDIOGRAM REPORT: CPT

## 2017-10-21 PROCEDURE — 85025 COMPLETE CBC W/AUTO DIFF WBC: CPT | Performed by: EMERGENCY MEDICINE

## 2017-10-21 PROCEDURE — 96367 TX/PROPH/DG ADDL SEQ IV INF: CPT

## 2017-10-21 PROCEDURE — 93971 EXTREMITY STUDY: CPT | Performed by: EMERGENCY MEDICINE

## 2017-10-21 PROCEDURE — 96365 THER/PROPH/DIAG IV INF INIT: CPT

## 2017-10-21 PROCEDURE — 80053 COMPREHEN METABOLIC PANEL: CPT | Performed by: EMERGENCY MEDICINE

## 2017-10-21 PROCEDURE — 81001 URINALYSIS AUTO W/SCOPE: CPT | Performed by: EMERGENCY MEDICINE

## 2017-10-21 RX ORDER — CELECOXIB 200 MG/1
200 CAPSULE ORAL DAILY
Status: DISCONTINUED | OUTPATIENT
Start: 2017-10-22 | End: 2017-10-23

## 2017-10-21 RX ORDER — HYDROXYZINE HYDROCHLORIDE 25 MG/1
25 TABLET, FILM COATED ORAL 3 TIMES DAILY PRN
Status: DISCONTINUED | OUTPATIENT
Start: 2017-10-21 | End: 2017-10-23

## 2017-10-21 RX ORDER — ACETAMINOPHEN 325 MG/1
650 TABLET ORAL EVERY 6 HOURS PRN
Status: DISCONTINUED | OUTPATIENT
Start: 2017-10-21 | End: 2017-10-23

## 2017-10-21 RX ORDER — ONDANSETRON 2 MG/ML
4 INJECTION INTRAMUSCULAR; INTRAVENOUS EVERY 6 HOURS PRN
Status: DISCONTINUED | OUTPATIENT
Start: 2017-10-21 | End: 2017-10-23

## 2017-10-21 RX ORDER — HYDROMORPHONE HYDROCHLORIDE 1 MG/ML
0.4 INJECTION, SOLUTION INTRAMUSCULAR; INTRAVENOUS; SUBCUTANEOUS EVERY 2 HOUR PRN
Status: DISCONTINUED | OUTPATIENT
Start: 2017-10-21 | End: 2017-10-23

## 2017-10-21 RX ORDER — DOCUSATE SODIUM 100 MG/1
100 CAPSULE, LIQUID FILLED ORAL 2 TIMES DAILY
Status: DISCONTINUED | OUTPATIENT
Start: 2017-10-21 | End: 2017-10-21

## 2017-10-21 RX ORDER — SODIUM PHOSPHATE, DIBASIC AND SODIUM PHOSPHATE, MONOBASIC 7; 19 G/133ML; G/133ML
1 ENEMA RECTAL ONCE AS NEEDED
Status: DISCONTINUED | OUTPATIENT
Start: 2017-10-21 | End: 2017-10-23

## 2017-10-21 RX ORDER — CETIRIZINE HYDROCHLORIDE 10 MG/1
10 TABLET ORAL DAILY
Status: DISCONTINUED | OUTPATIENT
Start: 2017-10-22 | End: 2017-10-23

## 2017-10-21 RX ORDER — ACETAMINOPHEN 500 MG
1000 TABLET ORAL ONCE
Status: COMPLETED | OUTPATIENT
Start: 2017-10-21 | End: 2017-10-21

## 2017-10-21 RX ORDER — HYDROMORPHONE HYDROCHLORIDE 1 MG/ML
0.5 INJECTION, SOLUTION INTRAMUSCULAR; INTRAVENOUS; SUBCUTANEOUS EVERY 30 MIN PRN
Status: DISCONTINUED | OUTPATIENT
Start: 2017-10-21 | End: 2017-10-21

## 2017-10-21 RX ORDER — ONDANSETRON 2 MG/ML
4 INJECTION INTRAMUSCULAR; INTRAVENOUS EVERY 4 HOURS PRN
Status: DISCONTINUED | OUTPATIENT
Start: 2017-10-21 | End: 2017-10-21

## 2017-10-21 RX ORDER — HYDROCODONE BITARTRATE AND ACETAMINOPHEN 10; 325 MG/1; MG/1
1 TABLET ORAL EVERY 4 HOURS PRN
Status: DISCONTINUED | OUTPATIENT
Start: 2017-10-21 | End: 2017-10-23

## 2017-10-21 RX ORDER — DOCUSATE SODIUM 100 MG/1
100 CAPSULE, LIQUID FILLED ORAL 2 TIMES DAILY
Status: DISCONTINUED | OUTPATIENT
Start: 2017-10-21 | End: 2017-10-23

## 2017-10-21 RX ORDER — HYDROMORPHONE HYDROCHLORIDE 1 MG/ML
0.2 INJECTION, SOLUTION INTRAMUSCULAR; INTRAVENOUS; SUBCUTANEOUS EVERY 2 HOUR PRN
Status: DISCONTINUED | OUTPATIENT
Start: 2017-10-21 | End: 2017-10-23

## 2017-10-21 RX ORDER — POLYETHYLENE GLYCOL 3350 17 G/17G
17 POWDER, FOR SOLUTION ORAL DAILY PRN
Status: DISCONTINUED | OUTPATIENT
Start: 2017-10-21 | End: 2017-10-23

## 2017-10-21 RX ORDER — SODIUM CHLORIDE 9 MG/ML
INJECTION, SOLUTION INTRAVENOUS CONTINUOUS
Status: DISCONTINUED | OUTPATIENT
Start: 2017-10-21 | End: 2017-10-23

## 2017-10-21 RX ORDER — HYDROMORPHONE HYDROCHLORIDE 1 MG/ML
0.8 INJECTION, SOLUTION INTRAMUSCULAR; INTRAVENOUS; SUBCUTANEOUS EVERY 2 HOUR PRN
Status: DISCONTINUED | OUTPATIENT
Start: 2017-10-21 | End: 2017-10-23

## 2017-10-21 RX ORDER — BISACODYL 10 MG
10 SUPPOSITORY, RECTAL RECTAL
Status: DISCONTINUED | OUTPATIENT
Start: 2017-10-21 | End: 2017-10-23

## 2017-10-21 RX ORDER — AMLODIPINE BESYLATE 5 MG/1
5 TABLET ORAL DAILY
Status: DISCONTINUED | OUTPATIENT
Start: 2017-10-22 | End: 2017-10-23

## 2017-10-21 RX ORDER — HYDROCODONE BITARTRATE AND ACETAMINOPHEN 10; 325 MG/1; MG/1
1-2 TABLET ORAL EVERY 4 HOURS PRN
Status: DISCONTINUED | OUTPATIENT
Start: 2017-10-21 | End: 2017-10-21

## 2017-10-21 RX ORDER — HYDROCODONE BITARTRATE AND ACETAMINOPHEN 10; 325 MG/1; MG/1
2 TABLET ORAL EVERY 4 HOURS PRN
Status: DISCONTINUED | OUTPATIENT
Start: 2017-10-21 | End: 2017-10-23

## 2017-10-21 NOTE — ED INITIAL ASSESSMENT (HPI)
Pt to ed from home with c/o post-op total knee fever and discomfort, pt sx was last wed,  Pt knee site red and swollen, pt denies cp or sob, pt family at bed side.

## 2017-10-21 NOTE — ED NOTES
Pt labs collected and sent, urine sent, iv in place, pt resting on the cart, pt family at bed side, pt son did not want start abx until talk with MD.

## 2017-10-21 NOTE — ED PROVIDER NOTES
Patient Seen in: [facility] Emergency Department    History   Patient presents with:  Fever (infectious)    Stated Complaint: fever    HPI    Patient has remarkable medical history of left knee osteoarthritis with left total knee replacement perform [facility]  4/26/2011: EXCISION TURBINATE,SUBMUCOUS      Comment: Performed by Luz Maria Gamboa at [address] [place] [address],[address]  4/26/2011: EXCISION TURBINATE,SUBMUCOUS      Comment: Performed by Luz Maria Gamboa at [place] [address] Triage Vitals [10/21/17 1722]  BP: 143/75  Pulse: 126  Resp: 22  Temp: 101.4 °F (38.6 °C)  Temp src: Temporal  SpO2: 99 %  O2 Device: n/a    Current:/59   Pulse 114   Temp 98.9 °F (37.2 °C) (Temporal)   Resp 18   Ht 162.6 cm (5' 4\")   Wt 54.9 kg   S Abnormal; Notable for the following:     Lactic Acid 2.3 (*)     All other components within normal limits   CBC W/ DIFFERENTIAL - Abnormal; Notable for the following:     HGB 10.7 (*)     HCT 32.6 (*)     .0 (*)     Neutrophil Absolute Prelim 7.94 and leukocytes with only 1-4 WBCs  Lactic acid is elevated 2.3    Chest x-ray  The heart and mediastinal contours are normal.  The lungs are clear.  There are no pleural effusions.  The bones are unremarkable    Venous Doppler left leg  EXTREMITY EXAMINED: Date Reviewed: 9/13/2017          ICD-10-CM Noted POA    Sepsis ([facility] [address] A41.9 10/21/2017 Unknown

## 2017-10-22 PROCEDURE — 85025 COMPLETE CBC W/AUTO DIFF WBC: CPT | Performed by: HOSPITALIST

## 2017-10-22 PROCEDURE — 97535 SELF CARE MNGMENT TRAINING: CPT

## 2017-10-22 PROCEDURE — 83605 ASSAY OF LACTIC ACID: CPT | Performed by: EMERGENCY MEDICINE

## 2017-10-22 PROCEDURE — 97162 PT EVAL MOD COMPLEX 30 MIN: CPT

## 2017-10-22 PROCEDURE — 97116 GAIT TRAINING THERAPY: CPT

## 2017-10-22 PROCEDURE — 97165 OT EVAL LOW COMPLEX 30 MIN: CPT

## 2017-10-22 PROCEDURE — 87086 URINE CULTURE/COLONY COUNT: CPT | Performed by: INTERNAL MEDICINE

## 2017-10-22 PROCEDURE — 80048 BASIC METABOLIC PNL TOTAL CA: CPT | Performed by: HOSPITALIST

## 2017-10-22 RX ORDER — ASPIRIN 325 MG
325 TABLET, DELAYED RELEASE (ENTERIC COATED) ORAL 2 TIMES DAILY
Status: DISCONTINUED | OUTPATIENT
Start: 2017-10-22 | End: 2017-10-23

## 2017-10-22 NOTE — OCCUPATIONAL THERAPY NOTE
OCCUPATIONAL THERAPY QUICK EVALUATION - INPATIENT    Room Number: 370/370-A  Evaluation Date: 10/22/2017     Type of Evaluation: Quick Eval  Presenting Problem: Sepsis (s/p L TKR 10/11)    Physician Order: IP Consult to Occupational Therapy  Reason for The [place]  4/26/2011: EXCISION TURBINATE,SUBMUCOUS      Comment: Performed by Melina Moncada at [address]  No date: HEMORRHOIDECTOMY  No date: HERNIA SURGERY      Comment: Bilateral  4/26/2011: NASAL LONDONO knee  Management Techniques: Activity promotion; Body mechanics;Breathing techniques;Relaxation;Repositioning    COGNITION  WFL    RANGE OF MOTION AND STRENGTH ASSESSMENT  Upper extremity ROM is within functional limits     Upper extremity strength is withi The AM-KATE ' '6-Clicks' Inpatient Daily Activity Short Form was completed and  this patient  is demonstrating a  0% degree impairment in activities of daily living. Research supports that patients with this level of impairment often benefit from home. Matt Johansen

## 2017-10-22 NOTE — PLAN OF CARE
Pt voided 800cc urine, sample sent for culture per orders. PVR completed per Dr Joan Vargas, residual = 234cc. Paged Dr. Joan Vargas to notify. Will continue to monitor.

## 2017-10-22 NOTE — H&P
[facility] Hospitalist History and Physical      Patient presents with:  Fever (infectious)       PCP: Destini García MD      History of Present Illness: Patient is a 77year old male with PMH sig for HL, HTN, and OA s/p recent L TKA 10/11 who presents to Comment: Performed by Luz Maria Gamboa at [address] [place] [address],[address]  4/26/2011: EXCISION TURBINATE,SUBMUCOUS      Comment: Performed by Luz Maria Gamboa at [address] [place] [address],[address]  No date: HEMORRHOIDECTOMY  No date: HERNIA S urgency. MUSCULOSKELETAL:  No arthritis  SKIN:  No change in skin, hair or nails. NEUROLOGIC:  No paresthesias, weakness, or numbness. PSYCHIATRIC:  No disorder of thought or mood. ENDOCRINE:  No heat or cold intolerance, polyuria or polydipsia.   HEMAT (deg=02487)    Result Date: 10/11/2017  PROCEDURE:  XR KNEE (1 OR 2 VIEWS), LEFT (CPT=73560)  COMPARISON:  None. INDICATIONS:  Knee replacement. PATIENT STATED HISTORY: (As transcribed by Technologist)  Patient provided no additional information.     FIND Limited images of the upper abdomen are unremarkable. BONES:  Mild to moderate degenerative changes of the spine. No bony lesion or fracture. CONCLUSION:   1. No evidence of pulmonary embolus.   2. Mild bilateral lower lobe bronchiectasis with mild are unremarkable. CONCLUSION:  No acute disease.      Dictated by: Jewelene Litten, MD on 10/21/2017 at 17:57     Approved by: Jewelene Litten, MD               Assessment/Plan:      Patient is a 77year old male with PMH sig for HL, HTN, and OA s/p recent

## 2017-10-22 NOTE — CONSULTS
[facility]  Report of Consultation    Agustíncarlos Kington Patient Status:  Inpatient    1940 MRN [id number]   [facility] 3SW-A Attending Wallace Sandoval, [address] Day # 0 PCP Chad Mcmillan MD     Reason for Consultation:infect Comment: Bilateral  4/26/2011: NASAL SCOPY,OPEN MAXILL SINUS      Comment: Performed by Kimi Duran at [address] [place] [address],[address]  4/26/2011: NASAL SCOPY,OPEN MAXILL SINUS      Comment: Performed by Kimi Duran at [place] [address] IVPB, 15 mg/kg, Intravenous, Q12H  •  0.9%  NaCl infusion, , Intravenous, Continuous  •  acetaminophen (TYLENOL) tab 650 mg, 650 mg, Oral, Q6H PRN  •  HYDROmorphone HCl PF (DILAUDID) 1 MG/ML injection 0.2 mg, 0.2 mg, Intravenous, Q2H PRN **OR** HYDROmorpho is intact. Gait deferred.      Laboratory Data:    Lab Results  Component Value Date   WBC 9.6 10/21/2017   HGB 10.7 10/21/2017   HCT 32.6 10/21/2017   .0 10/21/2017   CREATSERUM 1.00 10/21/2017   BUN 16 10/21/2017    10/21/2017   K 4.0 10/21/

## 2017-10-22 NOTE — CONSULTS
[facility] dosing service    Initial Pharmacokinetic Consult for Vancomycin Dosing     Eugenie Gowers is a 77year old male admitted on 10/21 who is being treated for cellulitis and sepsis.   Pharmacy has been asked to dose Vancomycin by Dr. Anay Car    He while on Vancomycin to assess renal function. 4.  Pharmacy will follow and monitor renal function changes, toxicity and efficacy. Pharmacy will continue to follow him. We appreciate the opportunity to assist in his care.     Jameel Diaz, PharmD  10/

## 2017-10-22 NOTE — PHYSICAL THERAPY NOTE
PHYSICAL THERAPY QUICK EVALUATION - INPATIENT    Room Number: 370/370-A  Evaluation Date: 10/22/2017  Presenting Problem: sepsis  Physician Order: PT Eval and Treat    Problem List  Principal Problem:    Sepsis, due to unspecified organism [facility])  Active Performed by Luz Maria Gamboa at [address]  4/26/2011: [facility] SINUS      Comment: Performed by Luz Maria Gamboa at [address]  4/26/2011: NASAL SCOPY,REMV PART ETHMOID      Comment difficulty does the patient currently have. ..  -   Turning over in bed (including adjusting bedclothes, sheets and blankets)?: None   -   Sitting down on and standing up from a chair with arms (e.g., wheelchair, bedside commode, etc.): None   -   Moving fr considered moderate. PT Discharge Recommendations: Home    PLAN  Patient has been evaluated and presents with no skilled Physical Therapy needs at this time. Patient discharged from Physical Therapy services.   Please re-order if a new functional limitati

## 2017-10-22 NOTE — ED NOTES
Dr David Carrillo for l knee aspiration, pt tolerated well. Pt return from ct-scan, pt family at bed side.

## 2017-10-22 NOTE — ED NOTES
Pt report called to floor rn, pt and family informed of admit to unit.  Pt 3rd dose of abx will started on the floor with admit rn, okay with er MD

## 2017-10-22 NOTE — CONSULTS
INFECTIOUS DISEASE CONSULT NOTE    Jordana Olsen Patient Status:  Inpatient    1940 MRN [id number]   [facility] [place] 3SW-A Attending Sheryl Diop, [address] [facility] Day # 1 PCP Bart Huddleston colonoscopy in 2015  7/11/15  [facility] Yanci Szymanski): COLONOSCOPY      Comment: Hx polyps: 2 small polyps (adenomas), large                int hemorrhoids; no further routine colon                cancer screening  7/11/2015: COLONOSCOPY,BIOPSY      Comment: Procedure Cancer Father      Throat cancer   • Heart Disease Brother      Heart valvular disease      reports that he has never smoked. He has never used smokeless tobacco. He reports that he does not drink alcohol or use drugs.     Allergies:  No Known Allergies of Systems:    A comprehensive 10 point review of systems was completed. Pertinent positives and negatives noted in the the HPI. Physical Exam:    General: No acute distress. Alert and oriented x 3.   Vital signs: Blood pressure 118/60, pulse 97, temper Osteoarthritis of knees, bilateral     Primary osteoarthritis of left knee           Global exp 1-9-17 / LEFT TKR / Berton Look / EB      Primary osteoarthritis of right knee     Sepsis ([address]     Sepsis, due to unspecified organism ([facility] [address]     Hyponatremia     Anemi

## 2017-10-22 NOTE — PLAN OF CARE
Oral temp 101. Son at bedside very concern about pt's temp wanting MD paged , instructed son that we will give him tylenol for the fever , and will continue to encourage pt to use I/S . Support given . Dr. Mendez Setting was updated .  Will continue to monitor

## 2017-10-22 NOTE — PROGRESS NOTES
Came to ER yesterday because of general weakness, fever/chills. No major change in left knee pain.       Temp:  [98.4 °F (36.9 °C)-101.4 °F (38.6 °C)] 99.6 °F (37.6 °C)  Pulse:  [] 94  Resp:  [18-22] 18  BP: (110-143)/(55-77) 110/56      Recent Labs

## 2017-10-23 VITALS
HEART RATE: 62 BPM | RESPIRATION RATE: 16 BRPM | DIASTOLIC BLOOD PRESSURE: 65 MMHG | SYSTOLIC BLOOD PRESSURE: 128 MMHG | HEIGHT: 64 IN | TEMPERATURE: 98 F | BODY MASS INDEX: 20.66 KG/M2 | WEIGHT: 121 LBS | OXYGEN SATURATION: 98 %

## 2017-10-23 PROCEDURE — 86140 C-REACTIVE PROTEIN: CPT | Performed by: INTERNAL MEDICINE

## 2017-10-23 PROCEDURE — 80048 BASIC METABOLIC PNL TOTAL CA: CPT | Performed by: HOSPITALIST

## 2017-10-23 PROCEDURE — 87493 C DIFF AMPLIFIED PROBE: CPT | Performed by: INTERNAL MEDICINE

## 2017-10-23 PROCEDURE — 85652 RBC SED RATE AUTOMATED: CPT | Performed by: INTERNAL MEDICINE

## 2017-10-23 PROCEDURE — 85025 COMPLETE CBC W/AUTO DIFF WBC: CPT | Performed by: HOSPITALIST

## 2017-10-23 PROCEDURE — 80202 ASSAY OF VANCOMYCIN: CPT | Performed by: HOSPITALIST

## 2017-10-23 NOTE — PROGRESS NOTES
[facility] Hospitalist Progress Note                                                                   13[address] [facility]  12/24/1940    SUBJECTIVE:  No acute events. No fevers since yesterday.   D hydroxide, bisacodyl, FLEET ENEMA, ondansetron HCl, HYDROcodone-acetaminophen **OR** HYDROcodone-acetaminophen, influenza virus vaccine PF    Assessment/Plan:  Principal Problem:    Sepsis, due to unspecified organism [facility])  Active Problems:    Sepsis ([facility] [address]

## 2017-10-23 NOTE — CM/SW NOTE
10/23/17 1600   CM/SW Referral Data   Referral Source Nurse   Reason for Referral Discharge planning   Informant [facility] Staff   Readmission Assessment   Factors that patient feels contributed to this readmission Other (only choose if nothing else applie

## 2017-10-23 NOTE — PAYOR COMM NOTE
--------------  ADMISSION REVIEW     Payor: [id number] #:  [id number]  Authorization Number: N/A    Admit date: 10/21/17  Admit time: 2140       Admitting Physician: Nataliya Connelly DO  Attending Physician:  Magda Plummer MD Screening: small colon polyp (adenoma), int                hemorrhoids; next colonoscopy in 2015  7/11/15  Jelani Go [address] Wayne Matson): COLONOSCOPY      Comment: Hx polyps: 2 small polyps (adenomas), large                int hemorrhoids; no further routine colon Geovany    Family History   Problem Relation Age of Onset   • Cancer Father      Throat cancer   • Heart Disease Brother      Heart valvular disease     Review of Systems    Positive for stated complaint: fever  Other systems are as noted in HP extremities with good strength and coordination.          ED Course[SS.1]     Labs Reviewed   COMP METABOLIC PANEL (14) - Abnormal; Notable for the following:        Result Value    Glucose 128 (*)     Albumin 3.3 (*)     Sodium 133 (*)     Chloride 100 (*) Course  ------------------------------------------------------------[SS. 2]  MDM   Patient is febrile. The skin about the wound does appear brightly erythematous and this could be the source. Patient was treated with sepsis bolus.   Blood cultures and la Warm and Dry  Color: Normal      Hank Spaulding  10/21/2017  7:21 PM      Patient's family was updated on the workup and treatment plan. Patient's son questioned why patient was not discharged from the hospital on antibiotics.   To my knowledge, this is not Medical History:   Diagnosis Date   • Deviated nasal septum     with NTH   • Dyslipidemia    • Hearing impairment    • Hemorrhoids    • High blood pressure    • HTN (hypertension)    • IFG (impaired fasting glucose)    • Normal cardiac stress test 3/22/201 Comment: Performed by [place] at [address]  7/11/2015: PATIENT DOCUMENTED NOT TO HAVE EXPERIENCED ANY*      Comment: Procedure: ;  Surgeon: Lexie Allred MD;                 Location: [address] [place] [address]  7/1 distended   Extremities: Extremities normal, atraumatic, no cyanosis or edema. L knee covered in Ace wrap   Skin: Skin color, texture, turgor normal. No rashes or lesions.     Neurologic: Moving all extremities spontaneously, no focal deficit appreciated Registry) which includes the Dose Index Registry. PATIENT STATED HISTORY:(As transcribed by Technologist)  Patient with post op fever. Patient is status post left total knee replacement.    CONTRAST USED:  57cc of Omnipaque 350  FINDINGS:  VASCULATURE:  No his left knee replaced 10 days and today it is painful, red, hot and swollen. FINDINGS:  EXTREMITY EXAMINED:  Left lower extremity. SAPHENOFEMORAL JUNCTION:  No reflux. THROMBI:  None visible.  COMPRESSION:  Normal compressibility, phasicity, and augment **Certification      PHYSICIAN Certification of Need for Inpatient Hospitalization - Initial Certification    Patient will require inpatient services that will reasonably be expected to span two midnight's based on the clinical documentation in H+P 101 °F (38.3 °C) -- -- -- -- -- RR   10/22/17 0400 100.2 °F (37.9 °C) 105 P 20 R 125/66 97 % -- RR

## 2017-10-23 NOTE — PROGRESS NOTES
120 [facility] dosing service    Follow-up Pharmacokinetic Consult for Vancomycin Dosing     Agustín Stubbs is a 77year old male who is being treated for cellulitis, r/o sepsis. Patient is on day 3 of Vancomycin 750 mg IV Q 12 hours.   Goal trough is 15-20 pharmacokinetics, actual weight and renal function)    2. Pharmacy will re-check Vancomycin trough levels prior to 4th new dose. Goal trough level 15-20 ug/mL. 3.  Pharmacy will need BUN/Scr daily while on Vancomycin to assess renal function. 4.   P

## 2017-10-23 NOTE — PLAN OF CARE
Impaired Activities of Daily Living    • Achieve highest/safest level of independence in self care Adequate for Discharge        Impaired Functional Mobility    • Achieve highest/safest level of mobility/gait Adequate for Discharge          DISCHARGE PLANN

## 2017-10-23 NOTE — CM/SW NOTE
10/23/17 1600   Discharge disposition   Discharged to: Home-Health   Name of Facillity/Home Care/Hospice ([address])   Home services after discharge Skilled home care   Discharge transportation Private car

## 2017-10-23 NOTE — PROGRESS NOTES
[address] [facility]  TEL: [phone]  FAX: [phone] Z Teodora Horvath Patient Status:  Inpatient    1940 MRN [id number]   [facility] 3SW-A Attending Charito Olivia MD   Hosp Day # 2 PCP Lucio Hayes neg    Radiology:    Result Date: 10/21/2017  PROCEDURE:  CT ANGIOGRAPHY, CHEST (CPT=71275)  COMPARISON:  None. INDICATIONS:  fever  TECHNIQUE:  IV contrast-enhanced multislice CT angiography is performed through the pulmonary arterial anatomy.  3D volume lower extremity venous system. B-mode two-dimensional images of the vascular structures, Doppler spectral analysis, and color flow. Doppler imaging were performed.   The following veins were imaged:  Common, deep, and superficial femoral, popliteal, saphen this time     Case and plan d/w pt and with family. D/w RN and with Dr Cheri Alves.     Shekhar Xavier

## 2017-10-23 NOTE — PROGRESS NOTES
Patient has no major complaints.     Temp:  [97.7 °F (36.5 °C)-98.7 °F (37.1 °C)] 97.9 °F (36.6 °C)  Pulse:  [57-97] 66  Resp:  [16-20] 18  BP: (111-137)/(58-70) 137/67    Sed rate 53  CRP 2.05    Recent Labs   Lab  10/21/17   1801  10/22/17   0520  10/23/1

## 2017-10-25 NOTE — PAYOR COMM NOTE
--------------  DISCHARGE REVIEW    Payor: [address] #:  [id number]  Authorization Number: [id number]    Admit date: 10/21/17  Admit time:  2140  Discharge Date: 10/23/2017  4:45 PM     Admitting Physician: DO Eddi Ortiz

## 2017-10-27 PROBLEM — Z96.652 S/P TOTAL KNEE REPLACEMENT, LEFT: Status: ACTIVE | Noted: 2017-10-27

## 2017-11-03 PROBLEM — A41.9 SEPSIS (HCC): Status: RESOLVED | Noted: 2017-10-21 | Resolved: 2017-11-03

## 2017-11-03 PROBLEM — A41.9 SEPSIS, DUE TO UNSPECIFIED ORGANISM: Status: RESOLVED | Noted: 2017-10-21 | Resolved: 2017-11-03

## 2017-11-10 NOTE — DISCHARGE SUMMARY
Discharge Summary  Patient ID:  Eric Garcia  [id number]  77year old  12/24/1940    Admit date: 10/11/2017    Discharge date and time: 10/13/17    Attending Physician: No att. providers found     Reason for admission: left knee osteoarthritis    Discharg R-3Dose reduction    triamcinolone acetonide 0.1 % External Cream  Apply topically 2 (two) times daily. , Normal, Disp-453.5 g, R-11 pound jar    Levocetirizine Dihydrochloride 5 MG Oral Tab  Take 1 tab daily, Normal, Disp-30 tablet, R-2      STOP taking th

## 2017-12-11 NOTE — DISCHARGE SUMMARY
General Medicine Discharge Summary     Patient ID:  Sindi Bray  77year old  12/24/1940    Admit date: 10/21/2017    Discharge date and time: 10/23/2017  4:45 PM     Attending Physician: Maira att.  prov r/o cdif but may just be abx related  - IV abx per ID, consider stopping     #OA s/p L TKA 10/11/17  -ortho following  -cont PT, pain control prn with oral meds and IV dialudid -eliquis changes to asa 325mg BID     #HTN- cont bp meds     PPX: on asa bid pe pedal edema   Neuro: CN inact, no focal deficits      Total time coordinating care for discharge: Greater than 30 minutes    . [facility]  [phone]

## 2018-07-10 NOTE — PAT NURSING NOTE
Patient and his family declined to attend the joint class as he has had a joint replacement in the past.

## 2018-07-18 ENCOUNTER — APPOINTMENT (OUTPATIENT)
Dept: LAB | Facility: HOSPITAL | Age: 78
End: 2018-07-18
Attending: ORTHOPAEDIC SURGERY
Payer: MEDICARE

## 2018-07-18 DIAGNOSIS — M17.11 PRIMARY OSTEOARTHRITIS OF RIGHT KNEE: ICD-10-CM

## 2018-07-18 LAB
ALBUMIN SERPL-MCNC: 3.9 G/DL (ref 3.5–4.8)
ALP LIVER SERPL-CCNC: 50 U/L (ref 45–117)
ALT SERPL-CCNC: 38 U/L (ref 17–63)
ANTIBODY SCREEN: NEGATIVE
APTT PPP: 29.2 SECONDS (ref 26.1–34.6)
AST SERPL-CCNC: 30 U/L (ref 15–41)
ATRIAL RATE: 53 BPM
BASOPHILS # BLD AUTO: 0.05 X10(3) UL (ref 0–0.1)
BASOPHILS NFR BLD AUTO: 0.8 %
BILIRUB SERPL-MCNC: 0.7 MG/DL (ref 0.1–2)
BUN BLD-MCNC: 9 MG/DL (ref 8–20)
CALCIUM BLD-MCNC: 8.9 MG/DL (ref 8.3–10.3)
CHLORIDE: 105 MMOL/L (ref 101–111)
CO2: 26 MMOL/L (ref 22–32)
CREAT BLD-MCNC: 0.9 MG/DL (ref 0.7–1.3)
EOSINOPHIL # BLD AUTO: 0.21 X10(3) UL (ref 0–0.3)
EOSINOPHIL NFR BLD AUTO: 3.4 %
ERYTHROCYTE [DISTWIDTH] IN BLOOD BY AUTOMATED COUNT: 13.6 % (ref 11.5–16)
GLUCOSE BLD-MCNC: 91 MG/DL (ref 70–99)
HCT VFR BLD AUTO: 40 % (ref 37–53)
HGB BLD-MCNC: 13 G/DL (ref 13–17)
IMMATURE GRANULOCYTE COUNT: 0.02 X10(3) UL (ref 0–1)
IMMATURE GRANULOCYTE RATIO %: 0.3 %
INR BLD: 1.19 (ref 0.9–1.1)
LYMPHOCYTES # BLD AUTO: 2.16 X10(3) UL (ref 0.9–4)
LYMPHOCYTES NFR BLD AUTO: 35.1 %
M PROTEIN MFR SERPL ELPH: 7.8 G/DL (ref 6.1–8.3)
MCH RBC QN AUTO: 28.1 PG (ref 27–33.2)
MCHC RBC AUTO-ENTMCNC: 32.5 G/DL (ref 31–37)
MCV RBC AUTO: 86.6 FL (ref 80–99)
MONOCYTES # BLD AUTO: 0.58 X10(3) UL (ref 0.1–1)
MONOCYTES NFR BLD AUTO: 9.4 %
NEUTROPHIL ABS PRELIM: 3.13 X10 (3) UL (ref 1.3–6.7)
NEUTROPHILS # BLD AUTO: 3.13 X10(3) UL (ref 1.3–6.7)
NEUTROPHILS NFR BLD AUTO: 51 %
P AXIS: 60 DEGREES
P-R INTERVAL: 176 MS
PLATELET # BLD AUTO: 287 10(3)UL (ref 150–450)
POTASSIUM SERPL-SCNC: 4.3 MMOL/L (ref 3.6–5.1)
PSA SERPL DL<=0.01 NG/ML-MCNC: 15.6 SECONDS (ref 12.4–14.7)
Q-T INTERVAL: 422 MS
QRS DURATION: 76 MS
QTC CALCULATION (BEZET): 395 MS
R AXIS: -4 DEGREES
RBC # BLD AUTO: 4.62 X10(6)UL (ref 3.8–5.8)
RED CELL DISTRIBUTION WIDTH-SD: 42.5 FL (ref 35.1–46.3)
RH BLOOD TYPE: POSITIVE
SODIUM SERPL-SCNC: 136 MMOL/L (ref 136–144)
T AXIS: 36 DEGREES
VENTRICULAR RATE: 53 BPM
WBC # BLD AUTO: 6.2 X10(3) UL (ref 4–13)

## 2018-07-18 PROCEDURE — 87081 CULTURE SCREEN ONLY: CPT

## 2018-07-18 PROCEDURE — 85730 THROMBOPLASTIN TIME PARTIAL: CPT

## 2018-07-18 PROCEDURE — 86901 BLOOD TYPING SEROLOGIC RH(D): CPT

## 2018-07-18 PROCEDURE — 80053 COMPREHEN METABOLIC PANEL: CPT

## 2018-07-18 PROCEDURE — 93005 ELECTROCARDIOGRAM TRACING: CPT

## 2018-07-18 PROCEDURE — 86850 RBC ANTIBODY SCREEN: CPT

## 2018-07-18 PROCEDURE — 36415 COLL VENOUS BLD VENIPUNCTURE: CPT

## 2018-07-18 PROCEDURE — 85025 COMPLETE CBC W/AUTO DIFF WBC: CPT

## 2018-07-18 PROCEDURE — 93010 ELECTROCARDIOGRAM REPORT: CPT | Performed by: INTERNAL MEDICINE

## 2018-07-18 PROCEDURE — 86900 BLOOD TYPING SEROLOGIC ABO: CPT

## 2018-07-18 PROCEDURE — 85610 PROTHROMBIN TIME: CPT

## 2018-07-19 PROBLEM — I77.819 AORTIC ECTASIA (HCC): Status: ACTIVE | Noted: 2018-07-19

## 2018-07-19 PROBLEM — M17.12 PRIMARY OSTEOARTHRITIS OF LEFT KNEE: Status: RESOLVED | Noted: 2017-06-22 | Resolved: 2018-07-19

## 2018-07-19 PROBLEM — J47.9 BRONCHIECTASIS WITHOUT COMPLICATION (HCC): Status: ACTIVE | Noted: 2018-07-19

## 2018-07-19 PROBLEM — J43.9 BLEB, LUNG (HCC): Status: ACTIVE | Noted: 2018-07-19

## 2018-07-30 ENCOUNTER — APPOINTMENT (OUTPATIENT)
Dept: GENERAL RADIOLOGY | Facility: HOSPITAL | Age: 78
DRG: 470 | End: 2018-07-30
Attending: ORTHOPAEDIC SURGERY
Payer: MEDICARE

## 2018-07-30 ENCOUNTER — ANESTHESIA (OUTPATIENT)
Dept: SURGERY | Facility: HOSPITAL | Age: 78
DRG: 470 | End: 2018-07-30
Payer: MEDICARE

## 2018-07-30 ENCOUNTER — SURGERY (OUTPATIENT)
Age: 78
End: 2018-07-30

## 2018-07-30 ENCOUNTER — HOSPITAL ENCOUNTER (INPATIENT)
Facility: HOSPITAL | Age: 78
LOS: 2 days | Discharge: HOME HEALTH | DRG: 470 | End: 2018-08-01
Attending: ORTHOPAEDIC SURGERY | Admitting: ORTHOPAEDIC SURGERY
Payer: MEDICARE

## 2018-07-30 ENCOUNTER — ANESTHESIA EVENT (OUTPATIENT)
Dept: SURGERY | Facility: HOSPITAL | Age: 78
DRG: 470 | End: 2018-07-30
Payer: MEDICARE

## 2018-07-30 DIAGNOSIS — M17.11 PRIMARY OSTEOARTHRITIS OF RIGHT KNEE: Primary | ICD-10-CM

## 2018-07-30 LAB
INR BLD: 0.99 (ref 0.9–1.1)
PSA SERPL DL<=0.01 NG/ML-MCNC: 13.5 SECONDS (ref 12.4–14.7)

## 2018-07-30 PROCEDURE — 73560 X-RAY EXAM OF KNEE 1 OR 2: CPT | Performed by: ORTHOPAEDIC SURGERY

## 2018-07-30 PROCEDURE — 88305 TISSUE EXAM BY PATHOLOGIST: CPT | Performed by: ORTHOPAEDIC SURGERY

## 2018-07-30 PROCEDURE — 0SRC0J9 REPLACEMENT OF RIGHT KNEE JOINT WITH SYNTHETIC SUBSTITUTE, CEMENTED, OPEN APPROACH: ICD-10-PCS | Performed by: ORTHOPAEDIC SURGERY

## 2018-07-30 PROCEDURE — 88311 DECALCIFY TISSUE: CPT | Performed by: ORTHOPAEDIC SURGERY

## 2018-07-30 PROCEDURE — 76942 ECHO GUIDE FOR BIOPSY: CPT | Performed by: ORTHOPAEDIC SURGERY

## 2018-07-30 PROCEDURE — 85610 PROTHROMBIN TIME: CPT

## 2018-07-30 PROCEDURE — 3E0T3BZ INTRODUCTION OF ANESTHETIC AGENT INTO PERIPHERAL NERVES AND PLEXI, PERCUTANEOUS APPROACH: ICD-10-PCS | Performed by: ANESTHESIOLOGY

## 2018-07-30 DEVICE — ATTUNE KNEE SYSTEM TIBIAL INSERT ROTATING PLATFORM POSTERIOR STABILIZED 5 15MM AOX
Type: IMPLANTABLE DEVICE | Site: KNEE | Status: FUNCTIONAL
Brand: ATTUNE

## 2018-07-30 DEVICE — ATTUNE KNEE SYSTEM TIBIAL BASE ROTATING PLATFORM SIZE 4 CEMENTED
Type: IMPLANTABLE DEVICE | Site: KNEE | Status: FUNCTIONAL
Brand: ATTUNE

## 2018-07-30 DEVICE — ATTUNE KNEE SYSTEM FEMORAL POSTERIOR STABILIZED SIZE 5 RIGHT CEMENTED
Type: IMPLANTABLE DEVICE | Site: KNEE | Status: FUNCTIONAL
Brand: ATTUNE

## 2018-07-30 DEVICE — CEMENT BONE RADIOPAQ HOWMEDICA: Type: IMPLANTABLE DEVICE | Site: KNEE | Status: FUNCTIONAL

## 2018-07-30 RX ORDER — SENNOSIDES 8.6 MG
17.2 TABLET ORAL NIGHTLY
Status: DISCONTINUED | OUTPATIENT
Start: 2018-07-30 | End: 2018-08-01

## 2018-07-30 RX ORDER — ZOLPIDEM TARTRATE 5 MG/1
5 TABLET ORAL NIGHTLY PRN
Status: DISCONTINUED | OUTPATIENT
Start: 2018-07-30 | End: 2018-08-01

## 2018-07-30 RX ORDER — OXYCODONE HYDROCHLORIDE AND ACETAMINOPHEN 5; 325 MG/1; MG/1
1-2 TABLET ORAL EVERY 4 HOURS PRN
Qty: 80 TABLET | Refills: 0 | Status: SHIPPED | OUTPATIENT
Start: 2018-07-30 | End: 2018-08-09

## 2018-07-30 RX ORDER — DIPHENHYDRAMINE HCL 25 MG
25 CAPSULE ORAL EVERY 4 HOURS PRN
Status: DISCONTINUED | OUTPATIENT
Start: 2018-07-30 | End: 2018-08-01

## 2018-07-30 RX ORDER — OXYCODONE HYDROCHLORIDE 5 MG/1
5 TABLET ORAL EVERY 4 HOURS PRN
Status: DISCONTINUED | OUTPATIENT
Start: 2018-07-30 | End: 2018-07-31

## 2018-07-30 RX ORDER — DIPHENHYDRAMINE HYDROCHLORIDE 50 MG/ML
12.5 INJECTION INTRAMUSCULAR; INTRAVENOUS EVERY 4 HOURS PRN
Status: DISCONTINUED | OUTPATIENT
Start: 2018-07-30 | End: 2018-08-01

## 2018-07-30 RX ORDER — TIZANIDINE 2 MG/1
2 TABLET ORAL 3 TIMES DAILY PRN
Status: DISCONTINUED | OUTPATIENT
Start: 2018-07-30 | End: 2018-08-01

## 2018-07-30 RX ORDER — KETOROLAC TROMETHAMINE 15 MG/ML
15 INJECTION, SOLUTION INTRAMUSCULAR; INTRAVENOUS EVERY 6 HOURS
Status: DISPENSED | OUTPATIENT
Start: 2018-07-30 | End: 2018-07-31

## 2018-07-30 RX ORDER — SODIUM CHLORIDE, SODIUM LACTATE, POTASSIUM CHLORIDE, CALCIUM CHLORIDE 600; 310; 30; 20 MG/100ML; MG/100ML; MG/100ML; MG/100ML
INJECTION, SOLUTION INTRAVENOUS CONTINUOUS
Status: DISCONTINUED | OUTPATIENT
Start: 2018-07-30 | End: 2018-07-30 | Stop reason: HOSPADM

## 2018-07-30 RX ORDER — DIPHENHYDRAMINE HYDROCHLORIDE 50 MG/ML
12.5 INJECTION INTRAMUSCULAR; INTRAVENOUS AS NEEDED
Status: DISCONTINUED | OUTPATIENT
Start: 2018-07-30 | End: 2018-07-30 | Stop reason: HOSPADM

## 2018-07-30 RX ORDER — CELECOXIB 200 MG/1
200 CAPSULE ORAL DAILY
Qty: 30 CAPSULE | Refills: 2 | Status: SHIPPED | OUTPATIENT
Start: 2018-07-30 | End: 2018-10-23 | Stop reason: ALTCHOICE

## 2018-07-30 RX ORDER — ACETAMINOPHEN 325 MG/1
TABLET ORAL
Status: COMPLETED
Start: 2018-07-30 | End: 2018-07-30

## 2018-07-30 RX ORDER — SODIUM CHLORIDE, SODIUM LACTATE, POTASSIUM CHLORIDE, CALCIUM CHLORIDE 600; 310; 30; 20 MG/100ML; MG/100ML; MG/100ML; MG/100ML
INJECTION, SOLUTION INTRAVENOUS CONTINUOUS
Status: DISCONTINUED | OUTPATIENT
Start: 2018-07-30 | End: 2018-08-01

## 2018-07-30 RX ORDER — MIDAZOLAM HYDROCHLORIDE 1 MG/ML
1 INJECTION INTRAMUSCULAR; INTRAVENOUS EVERY 5 MIN PRN
Status: DISCONTINUED | OUTPATIENT
Start: 2018-07-30 | End: 2018-07-30 | Stop reason: HOSPADM

## 2018-07-30 RX ORDER — DIPHENHYDRAMINE HYDROCHLORIDE 50 MG/ML
25 INJECTION INTRAMUSCULAR; INTRAVENOUS ONCE AS NEEDED
Status: ACTIVE | OUTPATIENT
Start: 2018-07-30 | End: 2018-07-30

## 2018-07-30 RX ORDER — DOCUSATE SODIUM 100 MG/1
100 CAPSULE, LIQUID FILLED ORAL 2 TIMES DAILY
Qty: 60 CAPSULE | Refills: 0 | Status: SHIPPED | OUTPATIENT
Start: 2018-07-30 | End: 2018-10-23 | Stop reason: ALTCHOICE

## 2018-07-30 RX ORDER — BISACODYL 10 MG
10 SUPPOSITORY, RECTAL RECTAL
Status: DISCONTINUED | OUTPATIENT
Start: 2018-07-30 | End: 2018-08-01

## 2018-07-30 RX ORDER — ACETAMINOPHEN 500 MG
1000 TABLET ORAL ONCE
Status: DISCONTINUED | OUTPATIENT
Start: 2018-07-30 | End: 2018-07-30 | Stop reason: HOSPADM

## 2018-07-30 RX ORDER — SODIUM PHOSPHATE, DIBASIC AND SODIUM PHOSPHATE, MONOBASIC 7; 19 G/133ML; G/133ML
1 ENEMA RECTAL ONCE AS NEEDED
Status: DISCONTINUED | OUTPATIENT
Start: 2018-07-30 | End: 2018-08-01

## 2018-07-30 RX ORDER — MORPHINE SULFATE 4 MG/ML
2 INJECTION, SOLUTION INTRAMUSCULAR; INTRAVENOUS EVERY 2 HOUR PRN
Status: ACTIVE | OUTPATIENT
Start: 2018-07-30 | End: 2018-07-31

## 2018-07-30 RX ORDER — CLINDAMYCIN PHOSPHATE 900 MG/50ML
900 INJECTION INTRAVENOUS EVERY 8 HOURS
Status: COMPLETED | OUTPATIENT
Start: 2018-07-30 | End: 2018-07-31

## 2018-07-30 RX ORDER — OXYCODONE HYDROCHLORIDE 15 MG/1
15 TABLET ORAL EVERY 4 HOURS PRN
Status: DISCONTINUED | OUTPATIENT
Start: 2018-07-30 | End: 2018-07-31

## 2018-07-30 RX ORDER — ONDANSETRON 2 MG/ML
4 INJECTION INTRAMUSCULAR; INTRAVENOUS EVERY 4 HOURS PRN
Status: ACTIVE | OUTPATIENT
Start: 2018-07-30 | End: 2018-08-01

## 2018-07-30 RX ORDER — ACETAMINOPHEN 500 MG
1000 TABLET ORAL ONCE
COMMUNITY

## 2018-07-30 RX ORDER — POLYETHYLENE GLYCOL 3350 17 G/17G
17 POWDER, FOR SOLUTION ORAL DAILY PRN
Status: DISCONTINUED | OUTPATIENT
Start: 2018-07-30 | End: 2018-08-01

## 2018-07-30 RX ORDER — ACETAMINOPHEN 325 MG/1
650 TABLET ORAL ONCE
Status: COMPLETED | OUTPATIENT
Start: 2018-07-30 | End: 2018-07-30

## 2018-07-30 RX ORDER — MELATONIN
325
Status: DISCONTINUED | OUTPATIENT
Start: 2018-07-31 | End: 2018-08-01

## 2018-07-30 RX ORDER — MORPHINE SULFATE 4 MG/ML
2 INJECTION, SOLUTION INTRAMUSCULAR; INTRAVENOUS EVERY 5 MIN PRN
Status: DISCONTINUED | OUTPATIENT
Start: 2018-07-30 | End: 2018-07-30 | Stop reason: HOSPADM

## 2018-07-30 RX ORDER — DOCUSATE SODIUM 100 MG/1
100 CAPSULE, LIQUID FILLED ORAL 2 TIMES DAILY
Status: DISCONTINUED | OUTPATIENT
Start: 2018-07-30 | End: 2018-08-01

## 2018-07-30 RX ORDER — METOCLOPRAMIDE HYDROCHLORIDE 5 MG/ML
10 INJECTION INTRAMUSCULAR; INTRAVENOUS EVERY 6 HOURS PRN
Status: ACTIVE | OUTPATIENT
Start: 2018-07-30 | End: 2018-08-01

## 2018-07-30 RX ORDER — NALOXONE HYDROCHLORIDE 0.4 MG/ML
80 INJECTION, SOLUTION INTRAMUSCULAR; INTRAVENOUS; SUBCUTANEOUS AS NEEDED
Status: DISCONTINUED | OUTPATIENT
Start: 2018-07-30 | End: 2018-07-30 | Stop reason: HOSPADM

## 2018-07-30 RX ORDER — MEPERIDINE HYDROCHLORIDE 25 MG/ML
12.5 INJECTION INTRAMUSCULAR; INTRAVENOUS; SUBCUTANEOUS AS NEEDED
Status: DISCONTINUED | OUTPATIENT
Start: 2018-07-30 | End: 2018-07-30 | Stop reason: HOSPADM

## 2018-07-30 RX ORDER — CLINDAMYCIN PHOSPHATE 900 MG/50ML
900 INJECTION INTRAVENOUS ONCE
Status: DISCONTINUED | OUTPATIENT
Start: 2018-07-30 | End: 2018-07-30 | Stop reason: HOSPADM

## 2018-07-30 RX ORDER — OXYCODONE HYDROCHLORIDE 10 MG/1
10 TABLET ORAL EVERY 4 HOURS PRN
Status: DISCONTINUED | OUTPATIENT
Start: 2018-07-30 | End: 2018-07-31

## 2018-07-30 RX ORDER — ACETAMINOPHEN 325 MG/1
650 TABLET ORAL 4 TIMES DAILY
Status: DISCONTINUED | OUTPATIENT
Start: 2018-07-30 | End: 2018-07-31

## 2018-07-30 NOTE — H&P
Shamir Francisco   7/19/2018 10:45 AM   Office Visit   MRN:  [id number]   Description: 69year old male Provider: Rich Hughes MD Department: [facility] Internal Medicine   Scanning Cover Sheet     Click to print Barcode Encounter Cover Sheet for linda CALCIUM      8.3 - 10.3 mg/dL 8.9   ALKALINE PHOSPHATASE      45 - 117 U/L 50   AST (SGOT)      15 - 41 U/L 30   ALT (SGPT)      17 - 63 U/L 38   Total Bilirubin      0.1 - 2.0 mg/dL 0.7   TOTAL PROTEIN      6.1 - 8.3 g/dL 7.8   Albumin      3.5 - 4.8 g/dL Comment: Performed by Farrah Viveros at [address]  4/26/2011: EXCISION TURBINATE,SUBMUCOUS      Comment: Performed by Farrah Viveros at [address]  No date: HEMORRHOIDECTOMY  No date: Bre Raines B Complex Vitamins (VITAMIN B COMPLEX OR) Take by mouth daily. Disp:  Rfl:    ALENDRONATE SODIUM 70 MG Oral Tab TAKE 1 TABLET BY MOUTH EVERY 7 DAYS.  TAKE WITH FULL GLASS OF WATER AND REMAIN UPRIGHT FOR 30 MIN Disp: 12 tablet Rfl: 0   AmLODIPine Besylate 5 - no history of adverse reaction to anesthesia  - METS>4  - no history of coagulopathy  - discussed post operative care with patient and daughter  - follow  Up discussed  - note to Dr. Smitha Culver and may proceed to the OR  - ELECTROCARDIOGRAM, COMPLETE     3. Ess Patient’s diagnosis and treatment options were discussed. Conservative care vs surgical intervention including joint replacement options were discussed. Extensive need for physical therapy after surgery emphasized.   Hospital course, recovery process, exp

## 2018-07-30 NOTE — ANESTHESIA POSTPROCEDURE EVALUATION
1300 [facility] Patient Status:  Surgery Admit   Age/Gender 69year old male MRN [id number]   Location 13[address] [facility] Attending Cindi Russell MD   Hosp Day # 0 PCP Barry Ramirez MD       Anesthesia Post-o

## 2018-07-30 NOTE — PROGRESS NOTES
NURSING ADMISSION NOTE      Patient admitted via bed, post right total knee replacement. Oriented to room. Received awake, alert and oriented x 4. Safety precautions initiated. Patient was accompanied by a family member. Bed in low position.   Disc

## 2018-07-30 NOTE — CONSULTS
[facility] Hospitalist Initial Consult       Reason for consult: Medical Management sp TKA      History of Present Illness: Patient is a 69year old male with PMH sig for HTN  who presents sp TKA.    They tolerated the procedure well without any immediate complica Comment: Procedure: COLONOSCOPY, POSSIBLE BIOPSY,                POSSIBLE POLYPECTOMY 81782;  Surgeon: Eleanor Robledo MD;  Location: [address] [place] [address]  4/26/2011: EXCISION TURBINATE,SUBMUCOUS      Comment: Performed Pulse 56   Temp 97.9 °F (36.6 °C) (Oral)   Resp 14   Ht 162.6 cm (5' 4\")   Wt 125 lb 10.6 oz (57 kg)   SpO2 100%   BMI 21.57 kg/m²   General:  Alert, no distress, appears stated age. Head:  Normocephalic, without obvious abnormality, atraumatic.    Eyes: Hospitalist  Pager [phone]

## 2018-07-30 NOTE — PROGRESS NOTES
Aida Rene   6/12/2018 10:30 AM   Office Visit   MRN:  [id number]   Description: 69year old male Provider: Alan Garcia MD Department: [facility]   Scanning Cover Sheet     Click to print Fredis Woo 852 for scanning   Office Visi

## 2018-07-30 NOTE — ANESTHESIA PREPROCEDURE EVALUATION
PRE-OP EVALUATION    Patient Name: Amna Bishop    Pre-op Diagnosis: Primary osteoarthritis of right knee [M17.11]    Procedure(s):  RIGHT TOTAL KNEE REPLACEMENT    Surgeon(s) and Role:     Lizett Mensah MD - Primary    Pre-op vitals reviewed.   Temp: 97 further routine colon                cancer screening  7/11/2015: COLONOSCOPY,BIOPSY      Comment: Procedure: ;  Surgeon: Angeles Quintero MD;                 Location: [address] [place] [address]  7/11/2015: Anirudh Perry N/A      Comment: Pro Results  Component Value Date   WBC 6.2 07/18/2018   RBC 4.62 07/18/2018   HGB 13.0 07/18/2018   HCT 40.0 07/18/2018   MCV 86.6 07/18/2018   MCH 28.1 07/18/2018   MCHC 32.5 07/18/2018   RDW 13.6 07/18/2018   .0 07/18/2018       Lab Results  Componen

## 2018-07-30 NOTE — PHYSICAL THERAPY NOTE
Attempted to see patient for PT evaluation. Patient presented with lack of motor & sensory control in bilateral LEs this time. Will initiate evaluation in morning of 07/31/18. SARAH Rubio was aware of above.

## 2018-07-30 NOTE — OPERATIVE REPORT
TOTAL KNEE OPERATIVE REPORT:  Layla Garcia       [id number]     12/24/1940    PREOP DX: RIGHT  KNEE PRIMARY OSTEOARTHRITIS  POSTOP DX:RIGHT KNEE PRIMARY OSTEOARTHRITIS  PROCEDURE: RIGHT TOTAL KNEE REPLACEMENT  SURGEON: Mushtaq Dumont MD  FIRST ASSIST:BERNARD MCMANUS FEMUR WAS FINISHED OFF WITH CHAMFER AND [address] CUTS IN USUAL FASHION. POSTERIOR FEMORAL OSTEOPHYTES WERE REMOVED. POSTERIOR CAPSULAR RELEASE WAS DONE CAREFULLY. PROXIMAL TIBIA WAS EXPOSED.   TIBIA WAS SIZED at 4 AND ROTATION WAS SET USING MEDIAL 1/3 OF

## 2018-07-31 LAB
ERYTHROCYTE [DISTWIDTH] IN BLOOD BY AUTOMATED COUNT: 13.7 % (ref 11.5–16)
HCT VFR BLD AUTO: 32.3 % (ref 37–53)
HGB BLD-MCNC: 10.7 G/DL (ref 13–17)
MCH RBC QN AUTO: 28.8 PG (ref 27–33.2)
MCHC RBC AUTO-ENTMCNC: 33.1 G/DL (ref 31–37)
MCV RBC AUTO: 86.8 FL (ref 80–99)
PLATELET # BLD AUTO: 216 10(3)UL (ref 150–450)
RBC # BLD AUTO: 3.72 X10(6)UL (ref 3.8–5.8)
RED CELL DISTRIBUTION WIDTH-SD: 43.4 FL (ref 35.1–46.3)
WBC # BLD AUTO: 9.5 X10(3) UL (ref 4–13)

## 2018-07-31 PROCEDURE — 97165 OT EVAL LOW COMPLEX 30 MIN: CPT

## 2018-07-31 PROCEDURE — 97161 PT EVAL LOW COMPLEX 20 MIN: CPT

## 2018-07-31 PROCEDURE — 97535 SELF CARE MNGMENT TRAINING: CPT

## 2018-07-31 PROCEDURE — 85027 COMPLETE CBC AUTOMATED: CPT | Performed by: ORTHOPAEDIC SURGERY

## 2018-07-31 PROCEDURE — 97116 GAIT TRAINING THERAPY: CPT

## 2018-07-31 PROCEDURE — 97150 GROUP THERAPEUTIC PROCEDURES: CPT

## 2018-07-31 RX ORDER — OXYCODONE HYDROCHLORIDE AND ACETAMINOPHEN 5; 325 MG/1; MG/1
1 TABLET ORAL EVERY 4 HOURS PRN
Status: DISCONTINUED | OUTPATIENT
Start: 2018-07-31 | End: 2018-08-01

## 2018-07-31 RX ORDER — OXYCODONE HYDROCHLORIDE AND ACETAMINOPHEN 5; 325 MG/1; MG/1
2 TABLET ORAL EVERY 4 HOURS PRN
Status: DISCONTINUED | OUTPATIENT
Start: 2018-07-31 | End: 2018-08-01

## 2018-07-31 NOTE — CM/SW NOTE
07/31/18 1300   CM/SW Referral Data   Referral Source Physician   Reason for Referral Discharge planning   Informant Patient   Pertinent Medical Hx   Primary Care Physician Name Vance Soulier   Patient Info   Patient's Mental Status Alert;Oriente

## 2018-07-31 NOTE — PROGRESS NOTES
Orthopedic surgery progress note    Dain Francisco Patient Status:  Inpatient    1940 MRN [id number]   [facility] 3SW-A Attending Elliott Meehan MD   Hosp Day # 1 PCP Elba Lam MD       Subjective:  Started to have some greg

## 2018-07-31 NOTE — PAYOR COMM NOTE
--------------  ADMISSION REVIEW     Payor: [id number] #:  [id number]  Authorization Number: [id number]    Admit date: 7/30/18  Admit time: 12       Admitting Physician: Phuong Snider MD  Attending Physician:  Phuong Snider MD  [facility] 0.90 - 4.00 x10(3) uL 2.16   Monocytes Absolute      0.10 - 1.00 x10(3) uL 0.58   Eosinophils Absolute      0.00 - 0.30 x10(3) uL 0.21   Basophils Absolute      0.00 - 0.10 x10(3) uL 0.05   Immature Granulocyte Absolute      0.00 - 1.00 x10(3) uL 0.02 Comment: Hx polyps: 2 small polyps (adenomas), large                int hemorrhoids; no further routine colon                cancer screening  7/11/2015: Debi Jaquez      Comment: Procedure: ;  Surgeon: Alix Delgado MD;                 Arthur Lemus • Cancer Father         Throat cancer   • Heart Disease Brother         Heart valvular disease      Smoking status: Never Smoker                                                               Smokeless tobacco: Never Used                      Alcohol use: N General: Alert and oriented in no apparent distress. HEENT: No focal deficits. Neck: No JVD, carotids 2+ no bruits. Cardiac: Regular rate and rhythm, S1, S2 normal, no murmur, rub or gallop. Lungs: Clear without wheezes, rales, rhonchi or dullness.   No Patient’s diagnosis and treatment options were discussed. Conservative care vs surgical intervention including joint replacement options were discussed. Extensive need for physical therapy after surgery emphasized.   Hospital course, recovery process, exp 7/30/2018 1042 New Bag (none) Intravenous John Saldivar RN      lactated ringers infusion     Date Action Dose Route User    7/30/2018 1354 Started Other (none) Intravenous Jody Stone RN      Tranexamic Acid (CYKLOKAPRON) 3,000 mg in sodium chlori PATIENT WAS CORRECTLY IDENTIFIED AND OPERATIVE SITE WAS VERIFIED IN THE PREOPERATIVE HOLDING AREA. PATIENT WAS BROUGHT TO THE OR AND WAS LAID IN SUPINE POSITION. PREOPERATIVE ANTIBIOTIC WAS GIVEN. NONOPERATIVE LEG HAD SCD APPLIED.    ANESTHESIA WAS ADMIN KNEE WAS FLEXED. FEMUR WAS SIZED AT 5. FEMORAL ROTATION WAS SET USING A TENSIONER. ANTERIOR AND POSTERIOR FEMORAL CUT WAS MADE. FLEXION AND EXTENSION GAPS WERE CHECKED USING RECTAGULAR SPACER BLOCK. GAPS WERE FOUND TO BE EQUAL.   VALGUS/VARUS STRESS TE

## 2018-07-31 NOTE — PHYSICAL THERAPY NOTE
PHYSICAL THERAPY KNEE EVALUATION - INPATIENT     Room Number: 375/375-A  Evaluation Date: 7/31/2018  Type of Evaluation: Initial  Physician Order: PT Eval and Treat    Presenting Problem: s/p right TKA 7/30/18  Reason for Therapy: Mobility Dysfunction and Nery Singh at [address] [place] [address],[address]  No date: HEMORRHOIDECTOMY  No date: HERNIA SURGERY      Comment: Bilateral  4/26/2011: NASAL SCOPY,OPEN MAXILL SINUS      Comment: Performed by Nery Singh at [facility] promotion    COGNITION  · Overall Cognitive Status:  WFL - within functional limits    RANGE OF MOTION AND STRENGTH ASSESSMENT  Upper extremity ROM and strength are within functional limits     Lower extremity ROM is within functional limits except right k transfer in and out of bed ind, good safety awareness. Pt able to perform right tke, marching in place with no right knee buckling, instructed in gait sequencing. Pt amb with rw with gait training emphasis on heel toe pattern, upright posture.   Pt retur training  Rehab Potential : Good  Frequency (Obs): BID  Number of Visits to Meet Established Goals: 3      CURRENT GOALS   Goal #1    Patient is able to demonstrate supine - sit EOB @ level: supervision    Goal #2    Patient is able to demonstrate transfer

## 2018-07-31 NOTE — PROGRESS NOTES
Patient and  attended group discharge education class. Discharge education provided utilizing \"hip/knee replacement discharge instructions\" sheet. Teach back done. Questions solicited and answered. Tolerated activity well.  Operative leg measured for

## 2018-07-31 NOTE — PHYSICAL THERAPY NOTE
PHYSICAL THERAPY KNEE TREATMENT NOTE - INPATIENT     Room Number: 375/375-A     Session: 1&2   Number of Visits to Meet Established Goals: 3    Presenting Problem: s/p right TKA 7/30/18  History related to current admission: pt admitted 7/30/18 for electi date: HERNIA SURGERY      Comment: Bilateral  4/26/2011: NASAL SCOPY,OPEN [place] SINUS      Comment: Performed by Farrah Viveros at [address]  4/26/2011: [facility] SINUS      Comment: Performed by Vandana Harley sitting on the side of the bed?: None   How much help from another person does the patient currently need. ..   -   Moving to and from a bed to a chair (including a wheelchair)?: None   -   Need to walk in hospital room?: None   -   Climbing 3-5 steps with reps   Glut Sets 10 reps 15 reps   Hip Abd/Add 10 reps 15 reps   Heel slides 10 reps 15 reps   Saq 10 reps 15 reps   SLR 10 reps 15 reps   Sitting Knee Flexion 10 reps 15 reps   Standing heel/toe raises 10 reps 15 reps   Standing knee flexion 10 reps 15 re

## 2018-07-31 NOTE — PROGRESS NOTES
[facility] Hospitalist Progress Note                                                                   1300 [facility]  12/24/1940    SUBJECTIVE:   No acute events. Pain is tolerable.   Denies light complication, transiton to po norco     HTN  - continue  hold norvasc for now, reassess in am    Acute blood loss anemia/postoperative  - as expected  - monitor daily CBC       Prevention: Xarelto, SCDs, bowel regimen           [address]

## 2018-07-31 NOTE — OCCUPATIONAL THERAPY NOTE
OCCUPATIONAL THERAPY QUICK EVALUATION - INPATIENT    Room Number: 375/375-A  Evaluation Date: 7/31/2018     Type of Evaluation: Quick Eval  Presenting Problem: s/p R TKA 7/30/18    Physician Order: IP Consult to Occupational Therapy  Reason for Therapy:  A [id number]  4/26/2011: EXCISION TURBINATE,SUBMUCOUS      Comment: Performed by Dallas Mcneill at [address]  No date: HEMORRHOIDECTOMY  No date: HERNIA SURGERY      Comment: Bilateral  4/26/2011: VINCE Bearing as Tolerated       PAIN ASSESSMENT  Ratin  Location: R knee  Management Techniques: Activity promotion; Body mechanics;Breathing techniques;Relaxation;Repositioning    COGNITION  WFL    RANGE OF MOTION AND STRENGTH ASSESSMENT  Upper extremity RO prevention, pain management, shower transfers, car transfers with good verbal understanding. Patient End of Session: Up in chair;Needs met;Call light within reach;RN aware of session/findings; All patient questions and concerns addressed;SCDs in place;I will have supervision at home  Patient/Caregiver able to demonstrate safety with ADLS: At supervision level or above; patient reports will have supervision at home

## 2018-07-31 NOTE — PLAN OF CARE
Patient wishing to discharge today- confirmed cleared with PT/OT. Page to Dr. Madalyn Felix at 4910- for medical clearance and review AVS if medically cleared. Patient with bloody drainage to [facility], dressing changed.  Small active drainage noted to mi

## 2018-08-01 VITALS
BODY MASS INDEX: 21.46 KG/M2 | HEART RATE: 86 BPM | RESPIRATION RATE: 18 BRPM | OXYGEN SATURATION: 98 % | DIASTOLIC BLOOD PRESSURE: 86 MMHG | TEMPERATURE: 98 F | HEIGHT: 64 IN | WEIGHT: 125.69 LBS | SYSTOLIC BLOOD PRESSURE: 145 MMHG

## 2018-08-01 LAB
ERYTHROCYTE [DISTWIDTH] IN BLOOD BY AUTOMATED COUNT: 13.6 % (ref 11.5–16)
HCT VFR BLD AUTO: 28.8 % (ref 37–53)
HGB BLD-MCNC: 9.6 G/DL (ref 13–17)
MCH RBC QN AUTO: 28.7 PG (ref 27–33.2)
MCHC RBC AUTO-ENTMCNC: 33.3 G/DL (ref 31–37)
MCV RBC AUTO: 86.2 FL (ref 80–99)
PLATELET # BLD AUTO: 218 10(3)UL (ref 150–450)
RBC # BLD AUTO: 3.34 X10(6)UL (ref 3.8–5.8)
RED CELL DISTRIBUTION WIDTH-SD: 42.2 FL (ref 35.1–46.3)
WBC # BLD AUTO: 10.7 X10(3) UL (ref 4–13)

## 2018-08-01 PROCEDURE — 97150 GROUP THERAPEUTIC PROCEDURES: CPT

## 2018-08-01 PROCEDURE — 97530 THERAPEUTIC ACTIVITIES: CPT

## 2018-08-01 PROCEDURE — 85027 COMPLETE CBC AUTOMATED: CPT | Performed by: ORTHOPAEDIC SURGERY

## 2018-08-01 RX ORDER — AMLODIPINE BESYLATE 5 MG/1
5 TABLET ORAL DAILY
Refills: 0 | Status: ON HOLD | COMMUNITY
Start: 2018-08-02 | End: 2018-08-03

## 2018-08-01 NOTE — PROGRESS NOTES
Acute Pain Service    Post Op Day 2 Ortho Note    Assessed patient in chair. Patient states Percocet is working well to manage pain; denies itching/nausea/dizziness. Patient able to bear weight on sx leg; equal sensation in BLE.  No further recommendati

## 2018-08-01 NOTE — PROGRESS NOTES
Orthopedic surgery progress note    Jordana Olsen Patient Status:  Inpatient    1940 MRN [id number]   [facility] 3SW-A Attending Tati Lu MD   [address] Day # 2 PCP Osman Laureano MD       Subjective:  Bleeding from wound yest

## 2018-08-01 NOTE — CM/SW NOTE
08/01/18 1650   Discharge disposition   Expected discharge disposition Home-Health   Name of [address] [place] [address]   Discharge transportation Private car

## 2018-08-01 NOTE — PROGRESS NOTES
[facility] Hospitalist Progress Note                                                                   [address] [facility]  12/24/1940    SUBJECTIVE:     No acute events overnight  Had some bleeding fro tomorrow 8/2 given improvement in BP    Acute blood loss anemia/postoperative  - as expected  - monitor daily CBC       Dispo: ok to d/c from IM standpoint if ok per alison Shepherd  [facility]

## 2018-08-01 NOTE — PLAN OF CARE
OK to restart Xeralto per Dr. Marcy Jacobson. Dr. Marcy Jacobson explained to Pt that knee may drain over the next few days.  Given ABD's and ACE wraps for dressing changes per Dr. Marcy Jacobson

## 2018-08-01 NOTE — CM/SW NOTE
08/01/18 1650   Discharge disposition   Expected discharge disposition Home-Health   Name of [address] [place] [address]   Discharge transportation Private car

## 2018-08-01 NOTE — PHYSICAL THERAPY NOTE
PHYSICAL THERAPY KNEE TREATMENT NOTE - INPATIENT     Room Number: 375/375-A     Session: 3  Number of Visits to Meet Established Goals: 3    Presenting Problem: s/p right TKA 7/30/18  History related to current admission: pt admitted 7/30/18 for elective HEMORRHOIDECTOMY  No date: HERNIA SURGERY      Comment: Bilateral  4/26/2011: NASAL SCOPY,OPEN MAXILL SINUS      Comment: Performed by Karli Rajan at [address] [place] [address],[address]  4/26/2011: NASAL SCOPY,OPEN MAXILL SINUS      Comment: Carmita Cutler on back to sitting on the side of the bed?: A Little   How much help from another person does the patient currently need. ..   -   Moving to and from a bed to a chair (including a wheelchair)?: A Little   -   Need to walk in hospital room?: A Little   -   C facility    DISCHARGE RECOMMENDATIONS  PT Discharge Recommendations: Home with home health PT (with family assist as needed)    PLAN  PT Treatment Plan: Energy conservation;Patient education;Gait training;Range of motion;Strengthening;Stair training;Transf

## 2018-08-01 NOTE — CM/SW NOTE
DAWSON received confirmation from [facility] AND [place] that insurance has authorized post discharge care. SOC planned for 8/2/18. DAWSON to update pt/RN. / to remain available for support and/or discharge planning.      ADDENDUM: (11:00)   DAWSON spoke w

## 2018-08-02 ENCOUNTER — HOSPITAL ENCOUNTER (OUTPATIENT)
Facility: HOSPITAL | Age: 78
Setting detail: OBSERVATION
Discharge: HOME HEALTH | End: 2018-08-03
Attending: EMERGENCY MEDICINE | Admitting: INTERNAL MEDICINE
Payer: MEDICARE

## 2018-08-02 DIAGNOSIS — D64.9 ANEMIA, UNSPECIFIED TYPE: Primary | ICD-10-CM

## 2018-08-02 LAB
BASOPHILS # BLD AUTO: 0.04 X10(3) UL (ref 0–0.1)
BASOPHILS NFR BLD AUTO: 0.4 %
EOSINOPHIL # BLD AUTO: 0.79 X10(3) UL (ref 0–0.3)
EOSINOPHIL NFR BLD AUTO: 7.8 %
ERYTHROCYTE [DISTWIDTH] IN BLOOD BY AUTOMATED COUNT: 13.5 % (ref 11.5–16)
HCT VFR BLD AUTO: 24.1 % (ref 37–53)
HGB BLD-MCNC: 8 G/DL (ref 13–17)
IMMATURE GRANULOCYTE COUNT: 0.1 X10(3) UL (ref 0–1)
IMMATURE GRANULOCYTE RATIO %: 1 %
LYMPHOCYTES # BLD AUTO: 1.66 X10(3) UL (ref 0.9–4)
LYMPHOCYTES NFR BLD AUTO: 16.4 %
MCH RBC QN AUTO: 28.7 PG (ref 27–33.2)
MCHC RBC AUTO-ENTMCNC: 33.2 G/DL (ref 31–37)
MCV RBC AUTO: 86.4 FL (ref 80–99)
MONOCYTES # BLD AUTO: 1.04 X10(3) UL (ref 0.1–1)
MONOCYTES NFR BLD AUTO: 10.3 %
NEUTROPHIL ABS PRELIM: 6.5 X10 (3) UL (ref 1.3–6.7)
NEUTROPHILS # BLD AUTO: 6.5 X10(3) UL (ref 1.3–6.7)
NEUTROPHILS NFR BLD AUTO: 64.1 %
PLATELET # BLD AUTO: 248 10(3)UL (ref 150–450)
RBC # BLD AUTO: 2.79 X10(6)UL (ref 3.8–5.8)
RED CELL DISTRIBUTION WIDTH-SD: 42.4 FL (ref 35.1–46.3)
WBC # BLD AUTO: 10.1 X10(3) UL (ref 4–13)

## 2018-08-02 PROCEDURE — 85025 COMPLETE CBC W/AUTO DIFF WBC: CPT | Performed by: EMERGENCY MEDICINE

## 2018-08-02 PROCEDURE — 99285 EMERGENCY DEPT VISIT HI MDM: CPT

## 2018-08-02 PROCEDURE — 36415 COLL VENOUS BLD VENIPUNCTURE: CPT

## 2018-08-02 RX ORDER — HYDROCODONE BITARTRATE AND ACETAMINOPHEN 5; 325 MG/1; MG/1
1 TABLET ORAL ONCE
Status: COMPLETED | OUTPATIENT
Start: 2018-08-02 | End: 2018-08-02

## 2018-08-02 NOTE — DISCHARGE SUMMARY
Discharge Summary  Patient ID:  Katheryn Stoddard  [id number]  69year old  12/24/1940    Admit date: 7/30/2018    Discharge date and time: 8/1/18    Attending Physician: No att. providers found     Reason for admission: right knee primary osteoarthritis    Flaquita Herron (two) times daily as needed., Normal, Disp-45 g, R-0    B Complex Vitamins (VITAMIN B COMPLEX OR)  Take by mouth daily. , Historical    ALENDRONATE SODIUM 70 MG Oral Tab  TAKE 1 TABLET BY MOUTH EVERY 7 DAYS.  TAKE WITH FULL GLASS OF WATER AND REMAIN UPRIGHT

## 2018-08-03 VITALS
WEIGHT: 126 LBS | SYSTOLIC BLOOD PRESSURE: 126 MMHG | RESPIRATION RATE: 20 BRPM | TEMPERATURE: 98 F | BODY MASS INDEX: 23.19 KG/M2 | OXYGEN SATURATION: 100 % | HEIGHT: 62 IN | HEART RATE: 90 BPM | DIASTOLIC BLOOD PRESSURE: 65 MMHG

## 2018-08-03 LAB
ANION GAP SERPL CALC-SCNC: 6 MMOL/L (ref 0–18)
ANTIBODY SCREEN: NEGATIVE
BASOPHILS # BLD AUTO: 0.04 X10(3) UL (ref 0–0.1)
BASOPHILS # BLD AUTO: 0.04 X10(3) UL (ref 0–0.1)
BASOPHILS NFR BLD AUTO: 0.4 %
BASOPHILS NFR BLD AUTO: 0.4 %
BUN BLD-MCNC: 11 MG/DL (ref 8–20)
BUN/CREAT SERPL: 12 (ref 10–20)
CALCIUM BLD-MCNC: 8.3 MG/DL (ref 8.3–10.3)
CHLORIDE SERPL-SCNC: 106 MMOL/L (ref 101–111)
CO2 SERPL-SCNC: 26 MMOL/L (ref 22–32)
CREAT BLD-MCNC: 0.92 MG/DL (ref 0.7–1.3)
EOSINOPHIL # BLD AUTO: 0.65 X10(3) UL (ref 0–0.3)
EOSINOPHIL # BLD AUTO: 0.78 X10(3) UL (ref 0–0.3)
EOSINOPHIL NFR BLD AUTO: 6.4 %
EOSINOPHIL NFR BLD AUTO: 7.9 %
ERYTHROCYTE [DISTWIDTH] IN BLOOD BY AUTOMATED COUNT: 13.8 % (ref 11.5–16)
ERYTHROCYTE [DISTWIDTH] IN BLOOD BY AUTOMATED COUNT: 13.9 % (ref 11.5–16)
GLUCOSE BLD-MCNC: 119 MG/DL (ref 70–99)
HCT VFR BLD AUTO: 23.8 % (ref 37–53)
HCT VFR BLD AUTO: 23.9 % (ref 37–53)
HGB BLD-MCNC: 7.7 G/DL (ref 13–17)
HGB BLD-MCNC: 7.7 G/DL (ref 13–17)
IMMATURE GRANULOCYTE COUNT: 0.09 X10(3) UL (ref 0–1)
IMMATURE GRANULOCYTE COUNT: 0.1 X10(3) UL (ref 0–1)
IMMATURE GRANULOCYTE RATIO %: 0.9 %
IMMATURE GRANULOCYTE RATIO %: 1 %
LYMPHOCYTES # BLD AUTO: 1.54 X10(3) UL (ref 0.9–4)
LYMPHOCYTES # BLD AUTO: 1.6 X10(3) UL (ref 0.9–4)
LYMPHOCYTES NFR BLD AUTO: 15.7 %
LYMPHOCYTES NFR BLD AUTO: 15.8 %
MCH RBC QN AUTO: 28.2 PG (ref 27–33.2)
MCH RBC QN AUTO: 28.4 PG (ref 27–33.2)
MCHC RBC AUTO-ENTMCNC: 32.2 G/DL (ref 31–37)
MCHC RBC AUTO-ENTMCNC: 32.4 G/DL (ref 31–37)
MCV RBC AUTO: 87.5 FL (ref 80–99)
MCV RBC AUTO: 87.8 FL (ref 80–99)
MONOCYTES # BLD AUTO: 0.98 X10(3) UL (ref 0.1–1)
MONOCYTES # BLD AUTO: 1.02 X10(3) UL (ref 0.1–1)
MONOCYTES NFR BLD AUTO: 10 %
MONOCYTES NFR BLD AUTO: 10 %
NEUTROPHIL ABS PRELIM: 6.39 X10 (3) UL (ref 1.3–6.7)
NEUTROPHIL ABS PRELIM: 6.74 X10 (3) UL (ref 1.3–6.7)
NEUTROPHILS # BLD AUTO: 6.39 X10(3) UL (ref 1.3–6.7)
NEUTROPHILS # BLD AUTO: 6.74 X10(3) UL (ref 1.3–6.7)
NEUTROPHILS NFR BLD AUTO: 65.1 %
NEUTROPHILS NFR BLD AUTO: 66.4 %
OSMOLALITY SERPL CALC.SUM OF ELEC: 287 MOSM/KG (ref 275–295)
PLATELET # BLD AUTO: 252 10(3)UL (ref 150–450)
PLATELET # BLD AUTO: 281 10(3)UL (ref 150–450)
POTASSIUM SERPL-SCNC: 4 MMOL/L (ref 3.6–5.1)
RBC # BLD AUTO: 2.71 X10(6)UL (ref 3.8–5.8)
RBC # BLD AUTO: 2.73 X10(6)UL (ref 3.8–5.8)
RED CELL DISTRIBUTION WIDTH-SD: 44.1 FL (ref 35.1–46.3)
RED CELL DISTRIBUTION WIDTH-SD: 44.4 FL (ref 35.1–46.3)
RH BLOOD TYPE: POSITIVE
SODIUM SERPL-SCNC: 138 MMOL/L (ref 136–144)
WBC # BLD AUTO: 10.2 X10(3) UL (ref 4–13)
WBC # BLD AUTO: 9.8 X10(3) UL (ref 4–13)

## 2018-08-03 PROCEDURE — 97161 PT EVAL LOW COMPLEX 20 MIN: CPT

## 2018-08-03 PROCEDURE — 97116 GAIT TRAINING THERAPY: CPT

## 2018-08-03 PROCEDURE — 85025 COMPLETE CBC W/AUTO DIFF WBC: CPT | Performed by: ORTHOPAEDIC SURGERY

## 2018-08-03 PROCEDURE — 80048 BASIC METABOLIC PNL TOTAL CA: CPT | Performed by: INTERNAL MEDICINE

## 2018-08-03 PROCEDURE — 85025 COMPLETE CBC W/AUTO DIFF WBC: CPT | Performed by: INTERNAL MEDICINE

## 2018-08-03 PROCEDURE — 86900 BLOOD TYPING SEROLOGIC ABO: CPT | Performed by: EMERGENCY MEDICINE

## 2018-08-03 PROCEDURE — 86901 BLOOD TYPING SEROLOGIC RH(D): CPT | Performed by: EMERGENCY MEDICINE

## 2018-08-03 PROCEDURE — 86850 RBC ANTIBODY SCREEN: CPT | Performed by: EMERGENCY MEDICINE

## 2018-08-03 RX ORDER — ACETAMINOPHEN 325 MG/1
650 TABLET ORAL EVERY 4 HOURS PRN
Status: DISCONTINUED | OUTPATIENT
Start: 2018-08-03 | End: 2018-08-03

## 2018-08-03 RX ORDER — SODIUM PHOSPHATE, DIBASIC AND SODIUM PHOSPHATE, MONOBASIC 7; 19 G/133ML; G/133ML
1 ENEMA RECTAL ONCE AS NEEDED
Status: DISCONTINUED | OUTPATIENT
Start: 2018-08-03 | End: 2018-08-03

## 2018-08-03 RX ORDER — DOCUSATE SODIUM 100 MG/1
100 CAPSULE, LIQUID FILLED ORAL 2 TIMES DAILY
Status: DISCONTINUED | OUTPATIENT
Start: 2018-08-03 | End: 2018-08-03

## 2018-08-03 RX ORDER — ONDANSETRON 2 MG/ML
4 INJECTION INTRAMUSCULAR; INTRAVENOUS EVERY 6 HOURS PRN
Status: DISCONTINUED | OUTPATIENT
Start: 2018-08-03 | End: 2018-08-03

## 2018-08-03 RX ORDER — METOCLOPRAMIDE HYDROCHLORIDE 5 MG/ML
10 INJECTION INTRAMUSCULAR; INTRAVENOUS EVERY 8 HOURS PRN
Status: DISCONTINUED | OUTPATIENT
Start: 2018-08-03 | End: 2018-08-03

## 2018-08-03 RX ORDER — MELATONIN
325
Qty: 30 TABLET | Refills: 0 | Status: SHIPPED | OUTPATIENT
Start: 2018-08-03

## 2018-08-03 RX ORDER — POLYETHYLENE GLYCOL 3350 17 G/17G
17 POWDER, FOR SOLUTION ORAL DAILY PRN
Status: DISCONTINUED | OUTPATIENT
Start: 2018-08-03 | End: 2018-08-03

## 2018-08-03 RX ORDER — BISACODYL 10 MG
10 SUPPOSITORY, RECTAL RECTAL
Status: DISCONTINUED | OUTPATIENT
Start: 2018-08-03 | End: 2018-08-03

## 2018-08-03 RX ORDER — HYDROCODONE BITARTRATE AND ACETAMINOPHEN 5; 325 MG/1; MG/1
2 TABLET ORAL EVERY 4 HOURS PRN
Status: DISCONTINUED | OUTPATIENT
Start: 2018-08-03 | End: 2018-08-03

## 2018-08-03 RX ORDER — HYDROCODONE BITARTRATE AND ACETAMINOPHEN 5; 325 MG/1; MG/1
1 TABLET ORAL EVERY 4 HOURS PRN
Status: DISCONTINUED | OUTPATIENT
Start: 2018-08-03 | End: 2018-08-03

## 2018-08-03 NOTE — ED PROVIDER NOTES
Patient Seen in: [facility] Emergency Department    History   Patient presents with:  Postop/Procedure Problem    Stated Complaint: POST OP BLEEDING    HPI    52-year-old male presents with bleeding from his surgical wound.   He had a right total kne BIOPSY,                POSSIBLE POLYPECTOMY 32127;  Surgeon: Yohana Rosario MD;  Location: [address] [place] [address]  4/26/2011: EXCISION TURBINATE,SUBMUCOUS      Comment: Performed by Huong Lopez at [place] [address] 2236]  BP: (!) 135/100  Pulse: 92  Resp: 18  Temp: 97.8 °F (36.6 °C)  Temp src: Temporal  SpO2: 100 %  O2 Device: None (Room air)    Current:BP (!) 135/100   Pulse 92   Temp 97.8 °F (36.6 °C) (Temporal)   Resp 18   Ht 157.5 cm (5' 2\")   Wt 57.2 kg   SpO2 placed a staple in between the spacing where the oozing of blood is coming from and hemostasis was achieved. A CBC was sent off for evaluation of hemoglobin level and the patient's hemoglobin has dropped from 10.7-8.0 in the last 2 days.   He is hemodynami

## 2018-08-03 NOTE — PHYSICAL THERAPY NOTE
PHYSICAL THERAPY EVALUATION - INPATIENT     Room Number: 373/373-A  Evaluation Date: 8/3/2018  Type of Evaluation: Initial  Physician Order: PT Eval and Treat    Presenting Problem: bleeding from right TKA incision  Reason for Therapy: Mobility Dysfu POSSIBLE POLYPECTOMY 01937;  Surgeon: Anisa Lechuga MD;  Location: [address] [place] [address]  4/26/2011: EXCISION TURBINATE,SUBMUCOUS      Comment: Performed by Austin Pierre at [address]  4 is to return home.      OBJECTIVE  Precautions: Knee immobilizer (at all times; do not bend knee)  Fall Risk: High fall risk    WEIGHT BEARING RESTRICTION  Weight Bearing Restriction: R lower extremity        R Lower Extremity: Weight Bearing as Tolerated approved by RN. Pt received sitting in bedside chair with knee immobilizer intact, agreeable to therapy; son at bedside. Pt and son educated on WBAT status of RLE. Pt denies dizziness. Blood pressure 128/76 sitting.  Sit to stand completed with supervision patient is below baseline and would benefit from skilled inpatient PT to address the above deficits to assist patient in returning to prior to level of function. Recommend home health PT upon discharge to maximize functional independence and safety.    DIS

## 2018-08-03 NOTE — PLAN OF CARE
ALERT ,AWAKE, ORIENTED X 4. SON AT BS . HGB 7.7. WILL NOTIFIED DR Peterson Covert WITH RESULT DENIES DIZZINESS OR BLURRY VISION.  /65

## 2018-08-03 NOTE — PLAN OF CARE
HEMATOLOGIC - ADULT    • Maintains hematologic stability Adequate for Discharge    • Free from bleeding injury Adequate for Discharge        Impaired Functional Mobility    • Achieve highest/safest level of mobility/gait Adequate for Discharge        PAIN

## 2018-08-03 NOTE — CM/SW NOTE
Informed by RN that pt may be cleared for d/c today. Pt is current with [facility] AND [place] [phone]. Update sent via [address] [place] [address] [employer].

## 2018-08-03 NOTE — PROGRESS NOTES
Came into ER due to bleeding from wound again. A staple was placed in ER and bleeding is now controlled.       Temp:  [97.8 °F (36.6 °C)-98.4 °F (36.9 °C)] 97.8 °F (36.6 °C)  Pulse:  [88-99] 88  Resp:  [16-18] 16  BP: (100-135)/() 100/62    No distre

## 2018-08-03 NOTE — PLAN OF CARE
NURSING ADMISSION NOTE      Patient admitted via cart from ER. Oriented to room. Safety precautions initiated. Bed in low position. Call light in reach. Right knee with gauze and ace wrap. Immobilizer in place. No bleeding noted on dressing.  Will

## 2018-08-03 NOTE — PLAN OF CARE
HEMATOLOGIC - ADULT    • Maintains hematologic stability Progressing    • Free from bleeding injury Progressing        PAIN - ADULT    • Verbalizes/displays adequate comfort level or patient's stated pain goal Progressing        SAFETY ADULT - FALL    • Fr

## 2018-08-03 NOTE — H&P
[facility] Hospitalist History and Physical      CC: Bleeding post op    PCP: Barry Ramirez MD    History of Present Illness: Patient is a 69year old male with PMH sig for OA, HL here with bleeding from the right knee post op.  He was seen in Dr. Rivas Gómez of 70 MG Oral Tab TAKE 1 TABLET BY MOUTH EVERY 7 DAYS.  TAKE WITH FULL GLASS OF WATER AND REMAIN UPRIGHT FOR 30 MIN Disp: 12 tablet Rfl: 0         Current Meds:  Scheduled:   • docusate sodium  100 mg Oral BID     Continuous Infusions:   PRN: acetaminophen **O vitals OK  - Start iron on DC    # Acute blood loss anemia  - 2/2 to above  - Iron on DC  - FU CBC as outpatient     # HTN  - Hold BP meds given low normal BP, anemia, FU PCP 1 week    DC later today if ambulating OK and Hgb stable.     Outpatient records o

## 2018-08-03 NOTE — CM/SW NOTE
08/03/18 1700   Discharge disposition   Expected discharge disposition Home-Health   Name of [address] [place] [address]   Discharge transportation Private car   AVS sent - RN/PT services to resume.

## 2018-08-15 PROBLEM — Z96.651 STATUS POST TOTAL RIGHT KNEE REPLACEMENT: Status: ACTIVE | Noted: 2018-08-15

## 2018-10-24 PROCEDURE — 86706 HEP B SURFACE ANTIBODY: CPT | Performed by: INTERNAL MEDICINE

## 2018-12-17 ENCOUNTER — APPOINTMENT (OUTPATIENT)
Dept: FAMILY MEDICINE | Age: 78
End: 2018-12-17

## 2018-12-19 ENCOUNTER — APPOINTMENT (OUTPATIENT)
Dept: INTERNAL MEDICINE | Age: 78
End: 2018-12-19

## 2019-01-09 ENCOUNTER — APPOINTMENT (OUTPATIENT)
Dept: INTERNAL MEDICINE | Age: 79
End: 2019-01-09

## 2019-01-22 ENCOUNTER — LAB SERVICES (OUTPATIENT)
Dept: LAB | Age: 79
End: 2019-01-22

## 2019-01-22 ENCOUNTER — OFFICE VISIT (OUTPATIENT)
Dept: INTERNAL MEDICINE | Age: 79
End: 2019-01-22

## 2019-01-22 VITALS
BODY MASS INDEX: 21.97 KG/M2 | SYSTOLIC BLOOD PRESSURE: 124 MMHG | HEART RATE: 65 BPM | HEIGHT: 63 IN | WEIGHT: 124 LBS | OXYGEN SATURATION: 99 % | TEMPERATURE: 96.9 F | DIASTOLIC BLOOD PRESSURE: 80 MMHG

## 2019-01-22 DIAGNOSIS — R73.01 IFG (IMPAIRED FASTING GLUCOSE): ICD-10-CM

## 2019-01-22 DIAGNOSIS — M81.0 AGE-RELATED OSTEOPOROSIS WITHOUT CURRENT PATHOLOGICAL FRACTURE: ICD-10-CM

## 2019-01-22 DIAGNOSIS — M81.0 AGE-RELATED OSTEOPOROSIS WITHOUT CURRENT PATHOLOGICAL FRACTURE: Primary | ICD-10-CM

## 2019-01-22 DIAGNOSIS — E61.1 IRON DEFICIENCY: ICD-10-CM

## 2019-01-22 LAB
ALBUMIN SERPL BCG-MCNC: 4.6 G/DL (ref 3.6–5.1)
ALP SERPL-CCNC: 57 U/L (ref 45–115)
ALT SERPL W/O P-5'-P-CCNC: 15 U/L (ref 10–35)
AST SERPL-CCNC: 21 U/L (ref 9–37)
BILIRUB SERPL-MCNC: 0.7 MG/DL (ref 0–1)
BUN SERPL-MCNC: 11 MG/DL (ref 6–27)
CALCIUM SERPL-MCNC: 9.8 MG/DL (ref 8.6–10.6)
CHLORIDE SERPL-SCNC: 102 MMOL/L (ref 96–107)
CREAT SERPL-MCNC: 0.8 MG/DL (ref 0.6–1.6)
DIFFERENTIAL TYPE: ABNORMAL
FERRITIN SERPL-MCNC: 35 NG/ML (ref 18–464)
GFR SERPL CREATININE-BSD FRML MDRD: >60 ML/MIN/{1.73M2}
GFR SERPL CREATININE-BSD FRML MDRD: >60 ML/MIN/{1.73M2}
GLUCOSE P FAST SERPL-MCNC: 100 MG/DL (ref 60–100)
HCO3 SERPL-SCNC: 25 MMOL/L (ref 22–32)
HEMATOCRIT: 41 % (ref 40–51)
HEMOGLOBIN: 13.2 G/DL (ref 13.7–17.5)
IRON SATN MFR SERPL: 44 % (ref 13–59)
IRON SERPL-MCNC: 154 UG/DL (ref 49–181)
LYMPH PERCENT: 29 % (ref 20.5–51.1)
LYMPHOCYTE ABSOLUTE #: 1.9 10*3/UL (ref 1.2–3.4)
MEAN CORPUSCULAR HGB CONCENTRATION: 32.2 % (ref 32–36)
MEAN CORPUSCULAR HGB: 28 PG (ref 27–34)
MEAN CORPUSCULAR VOLUME: 87 FL (ref 79–95)
MEAN PLATELET VOLUME: 8.4 FL (ref 8.6–12.4)
MIXED %: 12.6 % (ref 4.3–12.9)
MIXED ABSOLUTE #: 0.8 10*3/UL (ref 0.2–0.9)
NEUTROPHIL ABSOLUTE #: 4 10*3/UL (ref 1.4–6.5)
NEUTROPHIL PERCENT: 58.4 % (ref 34–73.5)
PLATELET COUNT: 289 10*3/UL (ref 150–400)
POTASSIUM SERPL-SCNC: 4.5 MMOL/L (ref 3.5–5.3)
PROT SERPL-MCNC: 8.1 G/DL (ref 6.2–8.1)
RED BLOOD CELL COUNT: 4.71 10*6/UL (ref 3.9–5.7)
RED CELL DISTRIBUTION WIDTH: 15.9 % (ref 11.3–14.8)
SODIUM SERPL-SCNC: 136 MMOL/L (ref 136–146)
TIBC SERPL-MCNC: 351 UG/DL (ref 250–450)
WHITE BLOOD CELL COUNT: 6.7 10*3/UL (ref 4–10)

## 2019-01-22 PROCEDURE — 36415 COLL VENOUS BLD VENIPUNCTURE: CPT | Performed by: INTERNAL MEDICINE

## 2019-01-22 PROCEDURE — 99204 OFFICE O/P NEW MOD 45 MIN: CPT | Performed by: INTERNAL MEDICINE

## 2019-01-22 PROCEDURE — 82728 ASSAY OF FERRITIN: CPT | Performed by: INTERNAL MEDICINE

## 2019-01-22 PROCEDURE — 83540 ASSAY OF IRON: CPT | Performed by: INTERNAL MEDICINE

## 2019-01-22 PROCEDURE — 85025 COMPLETE CBC W/AUTO DIFF WBC: CPT | Performed by: INTERNAL MEDICINE

## 2019-01-22 PROCEDURE — 83550 IRON BINDING TEST: CPT | Performed by: INTERNAL MEDICINE

## 2019-01-22 PROCEDURE — 80053 COMPREHEN METABOLIC PANEL: CPT | Performed by: INTERNAL MEDICINE

## 2019-01-22 RX ORDER — AMLODIPINE BESYLATE 5 MG/1
1 TABLET ORAL DAILY
COMMUNITY
Start: 2019-01-18 | End: 2019-01-22 | Stop reason: SDUPTHER

## 2019-01-22 RX ORDER — ALENDRONATE SODIUM 70 MG/1
TABLET ORAL
COMMUNITY
Start: 2018-11-09 | End: 2019-01-22 | Stop reason: ALTCHOICE

## 2019-01-22 RX ORDER — AMLODIPINE BESYLATE 5 MG/1
5 TABLET ORAL DAILY
Qty: 90 TABLET | Refills: 1 | Status: SHIPPED | OUTPATIENT
Start: 2019-01-22 | End: 2019-11-13 | Stop reason: SDUPTHER

## 2019-01-22 RX ORDER — TRIAMCINOLONE ACETONIDE 1 MG/G
CREAM TOPICAL
COMMUNITY
Start: 2018-07-19 | End: 2019-07-14

## 2019-01-22 SDOH — HEALTH STABILITY: MENTAL HEALTH: HOW OFTEN DO YOU HAVE A DRINK CONTAINING ALCOHOL?: NEVER

## 2019-01-22 SDOH — HEALTH STABILITY: PHYSICAL HEALTH: ON AVERAGE, HOW MANY MINUTES DO YOU ENGAGE IN EXERCISE AT THIS LEVEL?: 90 MIN

## 2019-01-22 SDOH — HEALTH STABILITY: MENTAL HEALTH
STRESS IS WHEN SOMEONE FEELS TENSE, NERVOUS, ANXIOUS, OR CAN'T SLEEP AT NIGHT BECAUSE THEIR MIND IS TROUBLED. HOW STRESSED ARE YOU?: NOT AT ALL

## 2019-01-22 SDOH — HEALTH STABILITY: PHYSICAL HEALTH: ON AVERAGE, HOW MANY DAYS PER WEEK DO YOU ENGAGE IN MODERATE TO STRENUOUS EXERCISE (LIKE A BRISK WALK)?: 7 DAYS

## 2019-10-04 ENCOUNTER — TELEPHONE (OUTPATIENT)
Dept: INTERNAL MEDICINE | Age: 79
End: 2019-10-04

## 2019-10-04 DIAGNOSIS — D64.9 ANEMIA, UNSPECIFIED TYPE: Primary | ICD-10-CM

## 2019-10-08 ENCOUNTER — LAB SERVICES (OUTPATIENT)
Dept: LAB | Age: 79
End: 2019-10-08

## 2019-10-08 DIAGNOSIS — D64.9 ANEMIA, UNSPECIFIED TYPE: ICD-10-CM

## 2019-10-08 LAB
ALBUMIN SERPL BCG-MCNC: 4.2 G/DL (ref 3.6–5.1)
ALP SERPL-CCNC: 71 U/L (ref 45–115)
ALT SERPL W/O P-5'-P-CCNC: 14 U/L (ref 10–35)
AST SERPL-CCNC: 24 U/L (ref 9–37)
BILIRUB SERPL-MCNC: 0.4 MG/DL (ref 0–1)
BUN SERPL-MCNC: 11 MG/DL (ref 6–27)
CALCIUM SERPL-MCNC: 9.6 MG/DL (ref 8.6–10.6)
CHLORIDE SERPL-SCNC: 100 MMOL/L (ref 96–107)
CREAT SERPL-MCNC: 1 MG/DL (ref 0.6–1.6)
DIFFERENTIAL TYPE: ABNORMAL
GFR SERPL CREATININE-BSD FRML MDRD: >60 ML/MIN/{1.73M2}
GFR SERPL CREATININE-BSD FRML MDRD: >60 ML/MIN/{1.73M2}
GLUCOSE SERPL-MCNC: 82 MG/DL (ref 70–200)
HCO3 SERPL-SCNC: 27 MMOL/L (ref 22–32)
HEMATOCRIT: 39.4 % (ref 40–51)
HEMOGLOBIN: 13.2 G/DL (ref 13.7–17.5)
LYMPH PERCENT: 34.3 % (ref 20.5–51.1)
LYMPHOCYTE ABSOLUTE #: 2.2 10*3/UL (ref 1.2–3.4)
MEAN CORPUSCULAR HGB CONCENTRATION: 33.5 % (ref 32–36)
MEAN CORPUSCULAR HGB: 28.7 PG (ref 27–34)
MEAN CORPUSCULAR VOLUME: 85.7 FL (ref 79–95)
MEAN PLATELET VOLUME: 9.4 FL (ref 8.6–12.4)
MIXED %: 13.4 % (ref 4.3–12.9)
MIXED ABSOLUTE #: 0.9 10*3/UL (ref 0.2–0.9)
NEUTROPHIL ABSOLUTE #: 3.3 10*3/UL (ref 1.4–6.5)
NEUTROPHIL PERCENT: 52.3 % (ref 34–73.5)
PLATELET COUNT: 271 10*3/UL (ref 150–400)
POTASSIUM SERPL-SCNC: 4.6 MMOL/L (ref 3.5–5.3)
PROT SERPL-MCNC: 7.3 G/DL (ref 6.2–8.1)
RED BLOOD CELL COUNT: 4.6 10*6/UL (ref 3.9–5.7)
RED CELL DISTRIBUTION WIDTH: 14.2 % (ref 11.3–14.8)
SODIUM SERPL-SCNC: 131 MMOL/L (ref 136–146)
WHITE BLOOD CELL COUNT: 6.4 10*3/UL (ref 4–10)

## 2019-10-08 PROCEDURE — 84443 ASSAY THYROID STIM HORMONE: CPT | Performed by: INTERNAL MEDICINE

## 2019-10-08 PROCEDURE — 36415 COLL VENOUS BLD VENIPUNCTURE: CPT | Performed by: INTERNAL MEDICINE

## 2019-10-08 PROCEDURE — 85025 COMPLETE CBC W/AUTO DIFF WBC: CPT | Performed by: INTERNAL MEDICINE

## 2019-10-08 PROCEDURE — 80053 COMPREHEN METABOLIC PANEL: CPT | Performed by: INTERNAL MEDICINE

## 2019-10-09 LAB — TSH SERPL DL<=0.05 MIU/L-ACNC: 1.25 M[IU]/L (ref 0.3–4.82)

## 2019-10-11 ENCOUNTER — OFFICE VISIT (OUTPATIENT)
Dept: INTERNAL MEDICINE | Age: 79
End: 2019-10-11

## 2019-10-11 VITALS
WEIGHT: 129.4 LBS | OXYGEN SATURATION: 98 % | HEIGHT: 63 IN | SYSTOLIC BLOOD PRESSURE: 114 MMHG | DIASTOLIC BLOOD PRESSURE: 80 MMHG | BODY MASS INDEX: 22.93 KG/M2 | HEART RATE: 67 BPM | TEMPERATURE: 97.8 F

## 2019-10-11 DIAGNOSIS — Z23 NEED FOR PNEUMOCOCCAL VACCINATION: ICD-10-CM

## 2019-10-11 DIAGNOSIS — M81.0 AGE-RELATED OSTEOPOROSIS WITHOUT CURRENT PATHOLOGICAL FRACTURE: ICD-10-CM

## 2019-10-11 DIAGNOSIS — D64.9 NORMOCYTIC ANEMIA: ICD-10-CM

## 2019-10-11 DIAGNOSIS — Z23 NEED FOR INFLUENZA VACCINATION: Primary | ICD-10-CM

## 2019-10-11 PROBLEM — I77.819 AORTIC ECTASIA (CMD): Status: ACTIVE | Noted: 2018-07-19

## 2019-10-11 PROBLEM — M17.11 PRIMARY OSTEOARTHRITIS OF RIGHT KNEE: Status: ACTIVE | Noted: 2017-06-22

## 2019-10-11 PROBLEM — J34.2 DEVIATED NASAL SEPTUM: Status: ACTIVE | Noted: 2019-10-11

## 2019-10-11 PROBLEM — R73.01 IFG (IMPAIRED FASTING GLUCOSE): Status: ACTIVE | Noted: 2019-10-11

## 2019-10-11 PROBLEM — J43.9 BLEB, LUNG (CMD): Status: ACTIVE | Noted: 2018-07-19

## 2019-10-11 PROBLEM — J47.9 BRONCHIECTASIS WITHOUT COMPLICATION (CMD): Status: ACTIVE | Noted: 2018-07-19

## 2019-10-11 PROBLEM — Z96.651 STATUS POST TOTAL RIGHT KNEE REPLACEMENT: Status: ACTIVE | Noted: 2018-08-15

## 2019-10-11 PROBLEM — E78.5 DYSLIPIDEMIA: Status: ACTIVE | Noted: 2019-10-11

## 2019-10-11 PROBLEM — I10 HTN (HYPERTENSION): Status: ACTIVE | Noted: 2019-10-11

## 2019-10-11 PROBLEM — M17.0 OSTEOARTHRITIS OF KNEES, BILATERAL: Status: ACTIVE | Noted: 2019-10-11

## 2019-10-11 PROBLEM — E87.1 HYPONATREMIA: Status: ACTIVE | Noted: 2017-10-21

## 2019-10-11 PROCEDURE — 3074F SYST BP LT 130 MM HG: CPT | Performed by: INTERNAL MEDICINE

## 2019-10-11 PROCEDURE — 99214 OFFICE O/P EST MOD 30 MIN: CPT | Performed by: INTERNAL MEDICINE

## 2019-10-11 PROCEDURE — 3079F DIAST BP 80-89 MM HG: CPT | Performed by: INTERNAL MEDICINE

## 2019-10-11 PROCEDURE — G0008 ADMIN INFLUENZA VIRUS VAC: HCPCS

## 2019-10-11 PROCEDURE — G0009 ADMIN PNEUMOCOCCAL VACCINE: HCPCS

## 2019-10-11 PROCEDURE — 90686 IIV4 VACC NO PRSV 0.5 ML IM: CPT

## 2019-10-11 PROCEDURE — 90732 PPSV23 VACC 2 YRS+ SUBQ/IM: CPT

## 2019-10-11 SDOH — HEALTH STABILITY: MENTAL HEALTH: HOW OFTEN DO YOU HAVE A DRINK CONTAINING ALCOHOL?: NEVER

## 2019-10-11 SDOH — HEALTH STABILITY: PHYSICAL HEALTH: ON AVERAGE, HOW MANY DAYS PER WEEK DO YOU ENGAGE IN MODERATE TO STRENUOUS EXERCISE (LIKE A BRISK WALK)?: 7 DAYS

## 2019-10-11 SDOH — HEALTH STABILITY: PHYSICAL HEALTH: ON AVERAGE, HOW MANY MINUTES DO YOU ENGAGE IN EXERCISE AT THIS LEVEL?: 90 MIN

## 2019-11-12 ENCOUNTER — E-ADVICE (OUTPATIENT)
Dept: INTERNAL MEDICINE | Age: 79
End: 2019-11-12

## 2019-11-13 RX ORDER — AMLODIPINE BESYLATE 5 MG/1
5 TABLET ORAL DAILY
Qty: 90 TABLET | Refills: 1 | Status: SHIPPED | OUTPATIENT
Start: 2019-11-13 | End: 2020-06-16 | Stop reason: SDUPTHER

## 2020-06-16 RX ORDER — AMLODIPINE BESYLATE 5 MG/1
5 TABLET ORAL DAILY
Qty: 90 TABLET | Refills: 1 | Status: SHIPPED | OUTPATIENT
Start: 2020-06-16 | End: 2021-08-06

## 2021-07-28 ENCOUNTER — TELEPHONE (OUTPATIENT)
Dept: INTERNAL MEDICINE | Age: 81
End: 2021-07-28

## 2021-08-06 RX ORDER — AMLODIPINE BESYLATE 5 MG/1
TABLET ORAL
Qty: 90 TABLET | Refills: 0 | Status: SHIPPED | OUTPATIENT
Start: 2021-08-06 | End: 2021-08-19 | Stop reason: SDUPTHER

## 2021-08-19 ENCOUNTER — OFFICE VISIT (OUTPATIENT)
Dept: INTERNAL MEDICINE | Age: 81
End: 2021-08-19

## 2021-08-19 VITALS
BODY MASS INDEX: 21.68 KG/M2 | DIASTOLIC BLOOD PRESSURE: 70 MMHG | HEIGHT: 64 IN | WEIGHT: 127 LBS | HEART RATE: 68 BPM | OXYGEN SATURATION: 100 % | SYSTOLIC BLOOD PRESSURE: 136 MMHG

## 2021-08-19 DIAGNOSIS — K64.9 HEMORRHOIDS, UNSPECIFIED HEMORRHOID TYPE: ICD-10-CM

## 2021-08-19 DIAGNOSIS — M81.0 AGE-RELATED OSTEOPOROSIS WITHOUT CURRENT PATHOLOGICAL FRACTURE: ICD-10-CM

## 2021-08-19 DIAGNOSIS — D64.9 ANEMIA, UNSPECIFIED TYPE: ICD-10-CM

## 2021-08-19 DIAGNOSIS — I10 ESSENTIAL HYPERTENSION: ICD-10-CM

## 2021-08-19 DIAGNOSIS — R35.1 BENIGN PROSTATIC HYPERPLASIA WITH NOCTURIA: ICD-10-CM

## 2021-08-19 DIAGNOSIS — E78.5 DYSLIPIDEMIA: ICD-10-CM

## 2021-08-19 DIAGNOSIS — Z00.00 PREVENTATIVE HEALTH CARE: Primary | ICD-10-CM

## 2021-08-19 DIAGNOSIS — R35.1 NOCTURIA: ICD-10-CM

## 2021-08-19 DIAGNOSIS — H91.90 HEARING LOSS, UNSPECIFIED HEARING LOSS TYPE, UNSPECIFIED LATERALITY: ICD-10-CM

## 2021-08-19 DIAGNOSIS — N40.1 BENIGN PROSTATIC HYPERPLASIA WITH NOCTURIA: ICD-10-CM

## 2021-08-19 PROCEDURE — 3078F DIAST BP <80 MM HG: CPT | Performed by: FAMILY MEDICINE

## 2021-08-19 PROCEDURE — G0438 PPPS, INITIAL VISIT: HCPCS | Performed by: FAMILY MEDICINE

## 2021-08-19 PROCEDURE — 99214 OFFICE O/P EST MOD 30 MIN: CPT | Performed by: FAMILY MEDICINE

## 2021-08-19 PROCEDURE — 3075F SYST BP GE 130 - 139MM HG: CPT | Performed by: FAMILY MEDICINE

## 2021-08-19 RX ORDER — MULTIVIT-MIN/IRON/FOLIC ACID/K 18-600-40
50 CAPSULE ORAL DAILY
Qty: 30 CAPSULE | Status: SHIPPED | COMMUNITY
Start: 2021-08-19

## 2021-08-19 RX ORDER — AMLODIPINE BESYLATE 5 MG/1
5 TABLET ORAL DAILY
Qty: 90 TABLET | Refills: 1 | Status: SHIPPED | OUTPATIENT
Start: 2021-08-19 | End: 2022-05-12

## 2021-08-19 RX ORDER — TAMSULOSIN HYDROCHLORIDE 0.4 MG/1
0.4 CAPSULE ORAL DAILY
Qty: 90 CAPSULE | Refills: 1 | Status: SHIPPED | OUTPATIENT
Start: 2021-08-19 | End: 2021-10-22 | Stop reason: SDUPTHER

## 2021-08-19 RX ORDER — CALCIUM CARBONATE 500 MG/1
1 TABLET, CHEWABLE ORAL DAILY
Status: SHIPPED | COMMUNITY
Start: 2021-08-19

## 2021-08-19 ASSESSMENT — PATIENT HEALTH QUESTIONNAIRE - PHQ9
1. LITTLE INTEREST OR PLEASURE IN DOING THINGS: NOT AT ALL
CLINICAL INTERPRETATION OF PHQ2 SCORE: NO FURTHER SCREENING NEEDED
SUM OF ALL RESPONSES TO PHQ9 QUESTIONS 1 AND 2: 0
SUM OF ALL RESPONSES TO PHQ9 QUESTIONS 1 AND 2: 0
2. FEELING DOWN, DEPRESSED OR HOPELESS: NOT AT ALL
CLINICAL INTERPRETATION OF PHQ9 SCORE: NO FURTHER SCREENING NEEDED

## 2021-08-23 ENCOUNTER — LAB SERVICES (OUTPATIENT)
Dept: LAB | Age: 81
End: 2021-08-23

## 2021-08-23 DIAGNOSIS — E78.5 DYSLIPIDEMIA: ICD-10-CM

## 2021-08-23 DIAGNOSIS — M81.0 AGE-RELATED OSTEOPOROSIS WITHOUT CURRENT PATHOLOGICAL FRACTURE: ICD-10-CM

## 2021-08-23 DIAGNOSIS — I10 ESSENTIAL HYPERTENSION: ICD-10-CM

## 2021-08-23 LAB
25(OH)D3 SERPL-MCNC: 69.4 NG/ML (ref 30–100)
ALBUMIN SERPL-MCNC: 4.3 G/DL (ref 3.6–5.1)
ALP SERPL-CCNC: 56 U/L (ref 45–115)
ALT SERPL W/O P-5'-P-CCNC: 17 U/L (ref 5–49)
AST SERPL-CCNC: 31 U/L (ref 14–43)
BASOPHIL %: 0.8 % (ref 0–1.2)
BASOPHIL ABSOLUTE #: 0.1 10*3/UL (ref 0–0.1)
BILIRUB SERPL-MCNC: 0.7 MG/DL (ref 0–1.3)
BILIRUBIN URINE: NEGATIVE
BLOOD URINE: ABNORMAL
BUN SERPL-MCNC: 14 MG/DL (ref 6–27)
CALCIUM SERPL-MCNC: 10 MG/DL (ref 8.6–10.6)
CHLORIDE SERPL-SCNC: 105 MMOL/L (ref 96–107)
CHOLEST SERPL-MCNC: 196 MG/DL (ref 140–200)
CLARITY: CLEAR
CO2 SERPL-SCNC: 23 MMOL/L (ref 22–32)
COLOR: ABNORMAL
CREAT SERPL-MCNC: 1.1 MG/DL (ref 0.6–1.6)
DIFFERENTIAL TYPE: ABNORMAL
EOSINOPHIL %: 4.6 % (ref 0–10)
EOSINOPHIL ABSOLUTE #: 0.3 10*3/UL (ref 0–0.5)
GFR SERPL CREATININE-BSD FRML MDRD: >60 ML/MIN/{1.73M2}
GFR SERPL CREATININE-BSD FRML MDRD: >60 ML/MIN/{1.73M2}
GLUCOSE P FAST SERPL-MCNC: 94 MG/DL (ref 60–100)
GLUCOSE QUALITATIVE U: NEGATIVE
HDLC SERPL-MCNC: 39 MG/DL
HEMATOCRIT: 36.8 % (ref 40–51)
HEMOGLOBIN: 11.8 G/DL (ref 13.7–17.5)
IMMATURE GRANULOCYTE ABSOLUTE: 0.02 10*3/UL (ref 0–0.05)
IMMATURE GRANULOCYTE PERCENT: 0.3 % (ref 0–0.5)
KETONES, URINE: NEGATIVE
LDLC SERPL CALC-MCNC: 136 MG/DL (ref 30–100)
LEUKOCYTE ESTERASE URINE: NEGATIVE
LYMPH PERCENT: 30.6 % (ref 20.5–51.1)
LYMPHOCYTE ABSOLUTE #: 1.9 10*3/UL (ref 1.2–3.4)
MEAN CORPUSCULAR HGB CONCENTRATION: 32.1 % (ref 32–36)
MEAN CORPUSCULAR HGB: 27.8 PG (ref 27–34)
MEAN CORPUSCULAR VOLUME: 86.6 FL (ref 79–95)
MEAN PLATELET VOLUME: 10.4 FL (ref 8.6–12.4)
MONOCYTE ABSOLUTE #: 0.6 10*3/UL (ref 0.2–0.9)
MONOCYTE PERCENT: 9.9 % (ref 4.3–12.9)
MUCOUS: NORMAL
NEUTROPHIL ABSOLUTE #: 3.4 10*3/UL (ref 1.4–6.5)
NEUTROPHIL PERCENT: 53.8 % (ref 34–73.5)
NITRITE URINE: NEGATIVE
PH URINE: 7 (ref 5–7)
PLATELET COUNT: 304 10*3/UL (ref 150–400)
POTASSIUM SERPL-SCNC: 4.1 MMOL/L (ref 3.5–5.3)
PROT SERPL-MCNC: 7.8 G/DL (ref 6.4–8.5)
RED BLOOD CELL COUNT: 4.25 10*6/UL (ref 3.9–5.7)
RED BLOOD CELLS URINE: 0 (ref 0–3)
RED CELL DISTRIBUTION WIDTH: 13.2 % (ref 11.3–14.8)
SODIUM SERPL-SCNC: 137 MMOL/L (ref 136–146)
SPECIFIC GRAVITY URINE: 1.01 (ref 1–1.03)
SQUAMOUS EPITHELIAL CELLS: 0
TRIGL SERPL-MCNC: 105 MG/DL (ref 0–200)
TSH SERPL DL<=0.05 MIU/L-ACNC: 2.06 M[IU]/L (ref 0.3–4.82)
URINE PROTEIN, QUAL (DIPSTICK): NEGATIVE
UROBILINOGEN URINE: <2
WHITE BLOOD CELL COUNT: 6.3 10*3/UL (ref 4–10)
WHITE BLOOD CELLS URINE: NORMAL (ref 0–5)

## 2021-08-23 PROCEDURE — 80061 LIPID PANEL: CPT | Performed by: FAMILY MEDICINE

## 2021-08-23 PROCEDURE — 85025 COMPLETE CBC W/AUTO DIFF WBC: CPT | Performed by: FAMILY MEDICINE

## 2021-08-23 PROCEDURE — 84443 ASSAY THYROID STIM HORMONE: CPT | Performed by: FAMILY MEDICINE

## 2021-08-23 PROCEDURE — 82306 VITAMIN D 25 HYDROXY: CPT | Performed by: FAMILY MEDICINE

## 2021-08-23 PROCEDURE — 36415 COLL VENOUS BLD VENIPUNCTURE: CPT | Performed by: FAMILY MEDICINE

## 2021-08-23 PROCEDURE — 80053 COMPREHEN METABOLIC PANEL: CPT | Performed by: FAMILY MEDICINE

## 2021-08-23 PROCEDURE — 81003 URINALYSIS AUTO W/O SCOPE: CPT | Performed by: FAMILY MEDICINE

## 2021-08-26 DIAGNOSIS — I10 ESSENTIAL HYPERTENSION: ICD-10-CM

## 2021-08-26 DIAGNOSIS — D64.9 ANEMIA, UNSPECIFIED TYPE: Primary | ICD-10-CM

## 2021-08-26 DIAGNOSIS — D64.9 LOW HEMATOCRIT: ICD-10-CM

## 2021-08-26 DIAGNOSIS — D64.9 LOW HEMOGLOBIN: ICD-10-CM

## 2021-08-26 DIAGNOSIS — E78.5 DYSLIPIDEMIA: ICD-10-CM

## 2021-08-27 ENCOUNTER — E-ADVICE (OUTPATIENT)
Dept: INTERNAL MEDICINE | Age: 81
End: 2021-08-27

## 2021-08-27 ENCOUNTER — OFFICE VISIT (OUTPATIENT)
Dept: SURGERY | Age: 81
End: 2021-08-27
Attending: FAMILY MEDICINE

## 2021-08-27 ENCOUNTER — TELEPHONE (OUTPATIENT)
Dept: GASTROENTEROLOGY | Age: 81
End: 2021-08-27

## 2021-08-27 VITALS — BODY MASS INDEX: 22.5 KG/M2 | WEIGHT: 127 LBS | TEMPERATURE: 97.6 F | HEIGHT: 63 IN

## 2021-08-27 DIAGNOSIS — K64.0 GRADE I HEMORRHOIDS: ICD-10-CM

## 2021-08-27 DIAGNOSIS — D50.9 IRON DEFICIENCY ANEMIA, UNSPECIFIED IRON DEFICIENCY ANEMIA TYPE: Primary | ICD-10-CM

## 2021-08-27 PROCEDURE — 99204 OFFICE O/P NEW MOD 45 MIN: CPT | Performed by: SURGERY

## 2021-08-27 ASSESSMENT — ENCOUNTER SYMPTOMS
HEMATOLOGIC/LYMPHATIC NEGATIVE: 1
NEUROLOGICAL NEGATIVE: 1
ENDOCRINE NEGATIVE: 1
GASTROINTESTINAL NEGATIVE: 1
RESPIRATORY NEGATIVE: 1
ALLERGIC/IMMUNOLOGIC NEGATIVE: 1
CONSTITUTIONAL NEGATIVE: 1

## 2021-08-27 ASSESSMENT — PAIN SCALES - GENERAL: PAINLEVEL: 2

## 2021-08-31 ENCOUNTER — OFFICE VISIT (OUTPATIENT)
Dept: INTERNAL MEDICINE | Age: 81
End: 2021-08-31

## 2021-08-31 VITALS
BODY MASS INDEX: 22.68 KG/M2 | OXYGEN SATURATION: 98 % | HEART RATE: 68 BPM | DIASTOLIC BLOOD PRESSURE: 68 MMHG | SYSTOLIC BLOOD PRESSURE: 124 MMHG | WEIGHT: 128 LBS | TEMPERATURE: 98 F | HEIGHT: 63 IN

## 2021-08-31 DIAGNOSIS — D50.8 OTHER IRON DEFICIENCY ANEMIA: ICD-10-CM

## 2021-08-31 DIAGNOSIS — R35.0 INCREASED URINARY FREQUENCY: ICD-10-CM

## 2021-08-31 DIAGNOSIS — L85.3 DRY SKIN DERMATITIS: Primary | ICD-10-CM

## 2021-08-31 DIAGNOSIS — K62.5 RECTAL BLEEDING: ICD-10-CM

## 2021-08-31 PROBLEM — J47.9 BRONCHIECTASIS WITHOUT COMPLICATION (CMD): Status: ACTIVE | Noted: 2018-07-19

## 2021-08-31 PROBLEM — I77.819 AORTIC ECTASIA (CMD): Status: ACTIVE | Noted: 2018-07-19

## 2021-08-31 PROCEDURE — 3078F DIAST BP <80 MM HG: CPT | Performed by: INTERNAL MEDICINE

## 2021-08-31 PROCEDURE — 99214 OFFICE O/P EST MOD 30 MIN: CPT | Performed by: INTERNAL MEDICINE

## 2021-08-31 PROCEDURE — 3074F SYST BP LT 130 MM HG: CPT | Performed by: INTERNAL MEDICINE

## 2021-08-31 RX ORDER — TRIAMCINOLONE ACETONIDE 1 MG/G
CREAM TOPICAL 2 TIMES DAILY
Qty: 15 G | Refills: 1 | Status: SHIPPED | OUTPATIENT
Start: 2021-08-31

## 2021-08-31 ASSESSMENT — PATIENT HEALTH QUESTIONNAIRE - PHQ9
SUM OF ALL RESPONSES TO PHQ9 QUESTIONS 1 AND 2: 0
CLINICAL INTERPRETATION OF PHQ9 SCORE: NO FURTHER SCREENING NEEDED
1. LITTLE INTEREST OR PLEASURE IN DOING THINGS: NOT AT ALL
CLINICAL INTERPRETATION OF PHQ2 SCORE: NO FURTHER SCREENING NEEDED
2. FEELING DOWN, DEPRESSED OR HOPELESS: NOT AT ALL
SUM OF ALL RESPONSES TO PHQ9 QUESTIONS 1 AND 2: 0

## 2021-09-08 ENCOUNTER — IMAGING SERVICES (OUTPATIENT)
Dept: BONE DENSITY | Age: 81
End: 2021-09-08
Attending: FAMILY MEDICINE

## 2021-09-08 DIAGNOSIS — M81.0 AGE-RELATED OSTEOPOROSIS WITHOUT CURRENT PATHOLOGICAL FRACTURE: ICD-10-CM

## 2021-09-08 PROCEDURE — 77080 DXA BONE DENSITY AXIAL: CPT | Performed by: RADIOLOGY

## 2021-09-29 ENCOUNTER — TELEPHONE (OUTPATIENT)
Dept: INTERNAL MEDICINE | Age: 81
End: 2021-09-29

## 2021-10-04 ENCOUNTER — APPOINTMENT (OUTPATIENT)
Dept: GASTROENTEROLOGY | Age: 81
End: 2021-10-04
Attending: INTERNAL MEDICINE

## 2021-10-13 ENCOUNTER — TELEPHONE (OUTPATIENT)
Dept: INTERNAL MEDICINE | Age: 81
End: 2021-10-13

## 2021-10-22 ENCOUNTER — OFFICE VISIT (OUTPATIENT)
Dept: INTERNAL MEDICINE | Age: 81
End: 2021-10-22

## 2021-10-22 VITALS
TEMPERATURE: 97.1 F | WEIGHT: 127 LBS | HEIGHT: 63 IN | DIASTOLIC BLOOD PRESSURE: 70 MMHG | BODY MASS INDEX: 22.5 KG/M2 | OXYGEN SATURATION: 99 % | HEART RATE: 73 BPM | SYSTOLIC BLOOD PRESSURE: 114 MMHG

## 2021-10-22 DIAGNOSIS — Z23 NEED FOR INFLUENZA VACCINATION: ICD-10-CM

## 2021-10-22 DIAGNOSIS — N40.1 BENIGN PROSTATIC HYPERPLASIA WITH URINARY FREQUENCY: Primary | ICD-10-CM

## 2021-10-22 DIAGNOSIS — R35.0 BENIGN PROSTATIC HYPERPLASIA WITH URINARY FREQUENCY: Primary | ICD-10-CM

## 2021-10-22 DIAGNOSIS — D50.0 IRON DEFICIENCY ANEMIA DUE TO CHRONIC BLOOD LOSS: ICD-10-CM

## 2021-10-22 DIAGNOSIS — R21 SKIN RASH: ICD-10-CM

## 2021-10-22 PROCEDURE — 90662 IIV NO PRSV INCREASED AG IM: CPT

## 2021-10-22 PROCEDURE — G0008 ADMIN INFLUENZA VIRUS VAC: HCPCS

## 2021-10-22 PROCEDURE — 3078F DIAST BP <80 MM HG: CPT | Performed by: INTERNAL MEDICINE

## 2021-10-22 PROCEDURE — 3074F SYST BP LT 130 MM HG: CPT | Performed by: INTERNAL MEDICINE

## 2021-10-22 PROCEDURE — 99213 OFFICE O/P EST LOW 20 MIN: CPT | Performed by: INTERNAL MEDICINE

## 2021-10-22 RX ORDER — TAMSULOSIN HYDROCHLORIDE 0.4 MG/1
0.4 CAPSULE ORAL DAILY
Qty: 90 CAPSULE | Refills: 1 | Status: SHIPPED | OUTPATIENT
Start: 2021-10-22

## 2021-10-22 ASSESSMENT — PATIENT HEALTH QUESTIONNAIRE - PHQ9
SUM OF ALL RESPONSES TO PHQ9 QUESTIONS 1 AND 2: 0
SUM OF ALL RESPONSES TO PHQ9 QUESTIONS 1 AND 2: 0
CLINICAL INTERPRETATION OF PHQ9 SCORE: NO FURTHER SCREENING NEEDED
1. LITTLE INTEREST OR PLEASURE IN DOING THINGS: NOT AT ALL
2. FEELING DOWN, DEPRESSED OR HOPELESS: NOT AT ALL
SUM OF ALL RESPONSES TO PHQ9 QUESTIONS 1 AND 2: 0
CLINICAL INTERPRETATION OF PHQ2 SCORE: NO FURTHER SCREENING NEEDED

## 2021-10-22 ASSESSMENT — PAIN SCALES - GENERAL: PAINLEVEL: 0

## 2021-10-25 ENCOUNTER — OFFICE VISIT (OUTPATIENT)
Dept: GASTROENTEROLOGY | Age: 81
End: 2021-10-25
Attending: SURGERY

## 2021-10-25 VITALS — BODY MASS INDEX: 22.5 KG/M2 | WEIGHT: 127 LBS

## 2021-10-25 DIAGNOSIS — K62.5 RECTUM BLEEDING: Primary | ICD-10-CM

## 2021-10-25 DIAGNOSIS — D64.9 ANEMIA, UNSPECIFIED TYPE: ICD-10-CM

## 2021-10-25 PROCEDURE — 99204 OFFICE O/P NEW MOD 45 MIN: CPT | Performed by: INTERNAL MEDICINE

## 2021-10-26 ENCOUNTER — TELEPHONE (OUTPATIENT)
Dept: GASTROENTEROLOGY | Age: 81
End: 2021-10-26

## 2021-10-26 DIAGNOSIS — K62.5 RECTAL BLEEDING: ICD-10-CM

## 2021-10-26 DIAGNOSIS — D64.9 ANEMIA, UNSPECIFIED TYPE: Primary | ICD-10-CM

## 2021-10-29 ENCOUNTER — APPOINTMENT (OUTPATIENT)
Dept: LAB | Age: 81
End: 2021-10-29

## 2021-11-01 ENCOUNTER — APPOINTMENT (OUTPATIENT)
Dept: GASTROENTEROLOGY | Age: 81
End: 2021-11-01
Attending: INTERNAL MEDICINE

## 2021-11-01 ENCOUNTER — TELEPHONE (OUTPATIENT)
Dept: SCHEDULING | Age: 81
End: 2021-11-01

## 2021-11-02 LAB — COVID-19 RESULT: NOT DETECTED

## 2021-11-04 ENCOUNTER — EXTERNAL RECORD (OUTPATIENT)
Dept: HEALTH INFORMATION MANAGEMENT | Facility: OTHER | Age: 81
End: 2021-11-04

## 2021-11-04 ENCOUNTER — EXTERNAL RECORD (OUTPATIENT)
Dept: GASTROENTEROLOGY | Age: 81
End: 2021-11-04

## 2021-11-04 ENCOUNTER — APPOINTMENT (OUTPATIENT)
Dept: OTHER | Facility: PHYSICIAN GROUP | Age: 81
End: 2021-11-04
Attending: INTERNAL MEDICINE

## 2021-11-04 PROCEDURE — 43239 EGD BIOPSY SINGLE/MULTIPLE: CPT | Performed by: INTERNAL MEDICINE

## 2021-11-04 PROCEDURE — 45385 COLONOSCOPY W/LESION REMOVAL: CPT | Performed by: INTERNAL MEDICINE

## 2021-11-04 PROCEDURE — 45380 COLONOSCOPY AND BIOPSY: CPT | Performed by: INTERNAL MEDICINE

## 2021-11-05 ENCOUNTER — APPOINTMENT (OUTPATIENT)
Dept: INTERNAL MEDICINE | Age: 81
End: 2021-11-05

## 2021-11-16 ENCOUNTER — E-ADVICE (OUTPATIENT)
Dept: GASTROENTEROLOGY | Age: 81
End: 2021-11-16

## 2021-11-17 ENCOUNTER — TELEPHONE (OUTPATIENT)
Dept: GASTROENTEROLOGY | Age: 81
End: 2021-11-17

## 2021-11-17 RX ORDER — PANTOPRAZOLE SODIUM 40 MG/1
40 TABLET, DELAYED RELEASE ORAL
Qty: 90 TABLET | Refills: 1 | Status: SHIPPED | OUTPATIENT
Start: 2021-11-17 | End: 2022-05-04

## 2021-12-07 ENCOUNTER — OFFICE VISIT (OUTPATIENT)
Dept: SURGERY | Age: 81
End: 2021-12-07

## 2021-12-07 VITALS — HEIGHT: 63 IN | TEMPERATURE: 97 F | BODY MASS INDEX: 22.5 KG/M2 | WEIGHT: 127 LBS

## 2021-12-07 DIAGNOSIS — K64.0 GRADE I HEMORRHOIDS: Primary | ICD-10-CM

## 2021-12-07 PROCEDURE — 99212 OFFICE O/P EST SF 10 MIN: CPT | Performed by: SURGERY

## 2021-12-07 ASSESSMENT — PAIN SCALES - GENERAL: PAINLEVEL: 2

## 2022-05-04 RX ORDER — PANTOPRAZOLE SODIUM 40 MG/1
40 TABLET, DELAYED RELEASE ORAL
Qty: 90 TABLET | Refills: 3 | Status: SHIPPED | OUTPATIENT
Start: 2022-05-04 | End: 2023-07-21

## 2022-05-09 ENCOUNTER — APPOINTMENT (OUTPATIENT)
Dept: INTERNAL MEDICINE | Age: 82
End: 2022-05-09

## 2022-05-12 RX ORDER — AMLODIPINE BESYLATE 5 MG/1
TABLET ORAL
Qty: 90 TABLET | Refills: 1 | Status: SHIPPED | OUTPATIENT
Start: 2022-05-12 | End: 2022-11-17

## 2022-05-13 ENCOUNTER — APPOINTMENT (OUTPATIENT)
Dept: LAB | Age: 82
End: 2022-05-13

## 2022-08-03 ENCOUNTER — APPOINTMENT (OUTPATIENT)
Dept: INTERNAL MEDICINE | Age: 82
End: 2022-08-03

## 2022-08-04 ENCOUNTER — APPOINTMENT (OUTPATIENT)
Dept: FAMILY MEDICINE | Age: 82
End: 2022-08-04

## 2022-11-17 RX ORDER — AMLODIPINE BESYLATE 5 MG/1
TABLET ORAL
Qty: 90 TABLET | Refills: 1 | Status: SHIPPED | OUTPATIENT
Start: 2022-11-17

## 2023-02-16 ENCOUNTER — EXTERNAL RECORD (OUTPATIENT)
Dept: HEALTH INFORMATION MANAGEMENT | Facility: OTHER | Age: 83
End: 2023-02-16

## 2023-07-21 RX ORDER — PANTOPRAZOLE SODIUM 40 MG/1
40 TABLET, DELAYED RELEASE ORAL
Qty: 90 TABLET | Refills: 0 | Status: SHIPPED | OUTPATIENT
Start: 2023-07-21

## 2023-07-25 RX ORDER — AMLODIPINE BESYLATE 5 MG/1
TABLET ORAL
Qty: 90 TABLET | Refills: 1 | OUTPATIENT
Start: 2023-07-25

## (undated) DEVICE — HOOD, PEEL-AWAY: Brand: FLYTE

## (undated) DEVICE — INSTRUMENT FEE

## (undated) DEVICE — ZIMMER® STERILE DISPOSABLE TOURNIQUET CUFF WITH PLC, DUAL PORT, SINGLE BLADDER, 34 IN. (86 CM)

## (undated) DEVICE — Device: Brand: STABLECUT®

## (undated) DEVICE — STERILE POLYISOPRENE POWDER-FREE SURGICAL GLOVES: Brand: PROTEXIS

## (undated) DEVICE — 3M™ MICROFOAM™ TAPE 1528-4: Brand: 3M™ MICROFOAM™

## (undated) DEVICE — DRESSING AQUACEL AG 3.5X12

## (undated) DEVICE — 3M™ COBAN™ NL STERILE NON-LATEX SELF-ADHERENT WRAP, 2084S, 4 IN X 5 YD (10 CM X 4,5 M), 18 ROLLS/CASE: Brand: 3M™ COBAN™

## (undated) DEVICE — TOTAL KNEE CDS: Brand: MEDLINE INDUSTRIES, INC.

## (undated) DEVICE — UNIVERSAL STERIBUMP® STERILE (5/CASE): Brand: UNIVERSAL STERIBUMP®

## (undated) DEVICE — PADDING CAST COTTON  4

## (undated) DEVICE — GOWN SURG AERO CHROME XXL

## (undated) DEVICE — CONVERTORS STOCKINETTE: Brand: CONVERTORS

## (undated) DEVICE — BANDAGE ELASTIC ACE 6\" X-LONG

## (undated) DEVICE — WRAP COOLING KNEE W/ICE PILLOW

## (undated) DEVICE — Device: Brand: PLUMEPEN

## (undated) DEVICE — ATTUNE PINNING SYSTEM
Type: IMPLANTABLE DEVICE | Site: KNEE
Brand: ATTUNE

## (undated) DEVICE — DRAPE,U/SHT,SPLIT,FILM,60X84,STERILE: Brand: MEDLINE

## (undated) DEVICE — 3M™ IOBAN™ 2 ANTIMICROBIAL INCISE DRAPE 6651EZ: Brand: IOBAN™ 2

## (undated) DEVICE — BOWL CEMENT MIX QUICK-VAC

## (undated) DEVICE — SOL  .9 1000ML BTL

## (undated) DEVICE — KENDALL SCD EXPRESS SLEEVES, KNEE LENGTH, MEDIUM: Brand: KENDALL SCD

## (undated) DEVICE — SUTURE VICRYL 2-0 CP-1

## (undated) DEVICE — DECANTER BAG 9": Brand: MEDLINE INDUSTRIES, INC.

## (undated) DEVICE — NEEDLE SPINAL 20X3-1/2 YELLOW

## (undated) DEVICE — CHLORAPREP 26ML APPLICATOR

## (undated) DEVICE — SUTURE STRATAFIX PDS PLUS 45CM

## (undated) DEVICE — SYRINGE 30ML LL TIP

## (undated) DEVICE — SPECIMEN CONTAINER,POSITIVE SEAL INDICATOR, OR PACKAGED: Brand: PRECISION

## (undated) NOTE — LETTER
[facility] Testing Department  Phone: [phone]  Right Fax: [phone]  [facility] 20 By: SALUD Abbasi RN Date: 18    Patient Name: Dorian Blue  Surgery Date: 2018    CSN: [id number]  Medical Record: [id number]

## (undated) NOTE — IP AVS SNAPSHOT
[address]            (For Outpatient Use Only) Initial Admit Date: 10/11/2017   Inpt/Obs Admit Date: Inpt: 10/11/17 / Obs: N/A   Discharge Date:    Huel Curling:  [de-identified]   MRN: [de-identified]   CSN: [id number]        [facility]

## (undated) NOTE — IP AVS SNAPSHOT
Patient Demographics     Address  [address] [place] [address] Phone  [phone] [facility]  [phone] [facility] E-mail Address  [email or web address]      Emergency Contact(s)     Name Relation Home Work Mobile    Shannan Loyd Relative [phone] incision, keep a thin layer of gauze over wound to diminish friction. ? There could be a small amount of redness around the staples or incision; this is normal.  ?  Watch for increased redness, warmth, any odor, increased drainage or opening of the incisio ? Contact physician if discomfort does not respond to pain medication    Body changes  ? Constipation – common with the use of narcotics. ? Use stool softeners such as Colace or Senakot while on narcotics, and laxatives if needed.  Eat fiber rich foods and ? cough up blood  ? have unexplained anxiety with breathing    The above information was reviewed with the patient and the patient verbalized understanding. Follow-up Information     Phuong Snider MD In 1 week.     Specialty:  SURGERY, ORTHOPEDIC  Why: Commonly known as:  XYZAL  Next dose due:  10/24/17 at 9 am      Take 1 tab daily   CHAS Phelan         triamcinolone acetonide 0.1 % Crea  Commonly known as:  KENALOG  Next dose due:  10/23/17  Notes to patient:  No ointments or lotions on left Specimen Information    Type Source Collected On   Blood — 10/23/17 0632          Components    Component Value Reference Range Flag Lab   Sed Rate 53 0 - 12 mm/Hr 3100 [facility], SERUM [[id number]] (Normal)  Resulted: 10/23/17 0 Components    Component Value Reference Range Flag Lab   C-Reactive Protein 2.05 <1.00 mg/dL [facility]            CBC WITH DIFFERENTIAL WITH PLATELET [[id number]]  Resulted: 10/23/17 0641, Result status: Final result   Ordering provider:  Ulices Lima, Specimen:  Blood from Blood,peripheral      Blood Culture Result No Growth 1 Day    Urine Culture, Routine Once [[id number]] Collected:  10/22/17 1535    Order Status:  Sent Lab Status:   In process Updated:  10/22/17 1544    Specimen:  Urine from Urine, cl 7/11/15  [facility] Santy Mandel): COLONOSCOPY      Comment: Hx polyps: 2 small polyps (adenomas), large                int hemorrhoids; no further routine colon                cancer screening  7/11/2015: COLONOSCOPY,BIOPSY      Comment: Procedure: ;  Surgeon: Miguel Quintanilla, No current outpatient prescriptions on file.        Smoking status: Never Smoker    Smokeless tobacco: Never Used    Alcohol use No        Fam Hx  Family History   Problem Relation Age of Onset   • Cancer Father      Throat cancer   • Heart Disease Brother Data Review:    LABS:     Lab Results  Component Value Date   WBC 5.3 10/22/2017   HGB 9.6 10/22/2017   HCT 29.9 10/22/2017   .0 10/22/2017   CREATSERUM 0.97 10/22/2017   BUN 14 10/22/2017    10/22/2017   K 4.1 10/22/2017    10/22/2017 pulmonary arterial thrombus or attenuation. THORACIC AORTA:  No aneurysm or visible dissection. LUNGS:  No visible pulmonary disease. There is mild bronchiectasis within the bilateral lower lobes. There is subsegmental atelectasis scarring lower lobes. and augmentation. OTHER:  Negative. CONCLUSION:  No DVT of the left lower extremity.     Dictated by: Francesca Garcia MD on 10/21/2017 at 19:38     Approved by: Francesca Garcia MD            Xr Chest Ap Portable  (cpt=71010)    Result Date: 10/21/2017  PRO Based on patients current state of illness, I anticipate that, after discharge, patient will require TBD.[DO.1]      Electronically signed by Srinivas Spaulding DO on 10/22/2017 11:30 AM   Attribution Key    DO. 1 - Srinivas Spaulding DO on 10/22/2017 11:23 A aspiration from joint and fluid was sent for culture - per notes did not look infected. Today he denies any new complaints. No cell count available. [RR.2]    History:  Past Medical History:   Diagnosis Date   • Deviated nasal septum     with NTH   • Dysl 4/26/2011: NASAL SCOPY,REMV PART ETHMOID      Comment: Performed by Dallas Mcneill at [address]  4/26/2011: NASAL SCOPY,REMV PART ETHMOID      Comment: Performed by Dallas Mcneill at [address],[facility] 404 •  PEG 3350 (MIRALAX) powder packet 17 g, 17 g, Oral, Daily PRN  •  magnesium hydroxide (MILK OF MAGNESIA) 400 MG/5ML suspension 30 mL, 30 mL, Oral, Daily PRN  •  bisacodyl (DULCOLAX) rectal suppository 10 mg, 10 mg, Rectal, Daily PRN  •  FLEET ENEMA (FLEE wounds.     Laboratory Data:    Lab Results  Component Value Date   WBC 5.3 10/22/2017   HGB 9.6 10/22/2017   HCT 29.9 10/22/2017   .0 10/22/2017   CREATSERUM 0.97 10/22/2017   BUN 14 10/22/2017    10/22/2017   K 4.1 10/22/2017    10/22/2 RR.1 - Jes Landon MD on 10/22/2017  2:07 PM  RR. 2 - Jes Landon MD on 10/22/2017  4:11 PM                     D/C Summary     No notes of this type exist for this encounter.          Physical Therapy Notes (last 72 hours) (Notes from 10/20/2017  4:17 7/11/2015: Odalys Mendez N/A      Comment: Procedure: COLONOSCOPY, POSSIBLE BIOPSY,                POSSIBLE POLYPECTOMY [address]9500;  Surgeon: Ana Barney MD;  Location: [address] [place] [address]  [facility]/2011[facility] stair negotiation. Pt's family is very supportive.      SUBJECTIVE  \"i don't want to move my arm because of the Iv's\"    OBJECTIVE     Fall Risk: Standard fall risk    WEIGHT BEARING RESTRICTION                   PAIN ASSESSMENT  Ratin         RANGE O Increased time spent on discussion regarding HEP 2 times per day, and ambulation 3-4 times per day with pt's son. Issued a HEP sheet with pt and encouraged pt to sit with knee flexed often. Also discussed symptom management with the pt and son.     [place] Physician Order: IP Consult to Occupational Therapy  Reason for Therapy:  ADL/IADL Dysfunction and Discharge Planning    History related to current admission: 77year old male. 10 days s/p post left TKA. Admitted 10/21 with fever.  Denies any increase in k No date: HERNIA SURGERY      Comment: Bilateral  4/26/2011: NASAL SCOPY,OPEN MAXILL SINUS      Comment: Performed by Luz Maria Gamboa at [address] [place] [address],[address]  4/26/2011: NASAL SCOPY,OPEN MAXILL SINUS      Comment: Performed by Darren Sahni Upper extremity ROM is within functional limits     Upper extremity strength is within functional limits     NEUROLOGICAL FINDINGS                   ACTIVITIES OF DAILY LIVING ASSESSMENT  AM-PAC ‘6-Clicks’ Inpatient Daily Activity Short Form   How much hel living. Research supports that patients with this level of impairment often benefit from home. . In this OT evaluation patient presents with the following performance deficits: decreased strength, post op pain, and generalized weakness.  These deficits im Pneumovax 23 11/22/11     TD 10/05/10     TYPHOID 10/05/10     Technetium Tc99mm Sestamibi, Iv, Up To 40 Millicuries 54/32/82     Zoster (Shingles) 02/03/14       Future Appointments        Provider Abdirahman Cannon    10/27/2017 10:15 AM Shantel Ortega MD

## (undated) NOTE — IP AVS SNAPSHOT
Patient Demographics     Address  [address] [place] [id number] Phone  [phone] [facility]  [phone] [facility] E-mail Address  [email or web address]      Emergency Contact(s)     Name Relation Home Work Mobile    Shannan Loyd Relative [phone] Please follow up with your PCP in one week from date of surgery to address any meidcal issues outside of knee and to reconcile any change in  Home medications from hospital stay. Knee Replacement Discharge Instructions    Activity    Bathing  ?  No tub FOR DRY GAUZE DRESSING:  ? Change dressing daily using dry sterile gauze and paper tape once Aquacel (waterproof) dressing changed (which is about 7 days after surgery). Continue this until you follow up in the office with your surgeon.  Have knee bent when ? Do not drink alcohol while on pain medication. ? Keep pain manageable; pain should not disrupt your sleep or activities like getting out of bed or walking. ? As you have less discomfort, decrease the amount of pain medication you take.  Use plain Tyleno ? Your surgeon may recommend that you take antibiotics before you undergo any dental or other invasive surgical procedures after your joint replacement. Speak with your physician about this at your post-op office visit.   ? Eat a balanced diet high in fiber ? Check with you surgeon when allowed so you don’t set yourself up for greater chance of complications. ? If traveling by car, get out to stretch every 2 hours. This helps prevent stiffness.   You may need to do this any time you travel for the first year 3. severe uncontrolled pain  4. redness, tenderness, or signs of infection (pain, swelling, redness, odor or green/yellow discharge around incision site)  5.  difficulty breathing, headache or visual disturbances  6. hives  7. persistent dizziness or light- Commonly known as:  VISTARIL      Take 1 capsule (25 mg total) by mouth 3 (three) times daily as needed.    Dorina Dandy, PA-C         Levocetirizine Dihydrochloride 5 MG Tabs  Commonly known as:  XYZAL  Next dose due:  Restart tomorrow morning your u [id number] docusate sodium (COLACE) cap 100 mg 10/12/17 2026 Given      [id number] docusate sodium (COLACE) cap 100 mg 10/13/17 0935 Given      [id number] ferrous sulfate EC tab 325 mg 10/13/17 0935 Given      [id number] oxyCODONE HCl (OXY-IR) cap/tab 5 mg (O H&P signed by Marti Mcguire MD at 10/11/2017  8:57 AM  Version 1 of 1    Author:  Marti Mcguire MD Service:  (none) Author Type:  Physician    Filed:  10/11/2017  8:57 AM Date of Service:  10/11/2017  8:55 AM Status:  Signed    :  Marti Mcguire MD (Phys 7/11/2015: Medhat Jimenez N/A      Comment: Procedure: COLONOSCOPY, POSSIBLE BIOPSY,                POSSIBLE POLYPECTOMY 62727;  Surgeon: Althea Andrew MD;  Location: [address] [place] [address]  [facility] Alend[facility] 70 MG Oral Tab Take 70 mg by mouth once a week. Disp:  Rfl:  9/26/2017   aspirin 81 MG Oral Tab Take 81 mg by mouth daily. Disp:  Rfl:  9/27/2017   AmLODIPine Besylate 5 MG Oral Tab Take 1 tablet (5 mg total) by mouth daily.  Disp: 90 tab nerve and blood vessel injuries, arthrofibrosis, fractures, dislocations, chronic pain, blood transfusions, possible revision surgeries, anesthesia and other medical complications explained. All questions were encouraged and answered.   Patient consents t Past Surgical History:  8/4/2010  [facility]: COLONOSCOPY      Comment: Screening: small colon polyp (adenoma), int                hemorrhoids; next colonoscopy in 2015  7/11/15  [facility] [address] [facility] [facility]): COLONOSCOPY      Comment: Hx polyps: 2 small polyps (adenomas), large Comment: Performed by Rajni Odell at [address]     ALL:  No Known Allergies     Home Medications:    Outpatient Prescriptions Marked as Taking for the 10[facility]11[facility]17 [facility]   Alendronate Sodium 70 MG Head:  Normocephalic, without obvious abnormality, atraumatic. Eyes:  Sclera anicteric, No conjunctival pallor, EOMs intact. Nose: Nares normal. Septum midline. Mucosa normal. No drainage.    Throat: Lips, mucosa, and tongue normal. Teeth and gums norm defined radiographically. Please also correlate with surgical findings. CONCLUSION:  Status post knee arthroplasty.     Dictated by: [place] Bryant MD on 10/11/2017 at 13:41     Approved by: [place] Bryant MD               ASSESSMENT / PLAN:     Patient is a Number of Visits to Meet Established Goals: 4    Presenting Problem: s/p left TKA 10/11/17    Problem List  Active Problems:    Primary osteoarthritis of left knee      Past Medical History   Past Medical History:   Diagnosis Date   • Deviated nasal septum Comment: Performed by Slime Stevenson at [address] 4/26/2011: NASAL SCOPY,REMV PART ETHMOID      Comment: Performed by Slime Stevenson at [address] 4/26/2011: [place] -   Climbing 3-5 steps with a railing?: A Little       AM-PAC Score:  Raw Score: 21   PT Approx Degree of Impairment Score: 28.97%   Standardized Score (AM-PAC Scale): 50.25   CMS Modifier (G-Code): CJ    FUNCTIONAL ABILITY STATUS  Gait Assessment   Gait A address the activity limitations identified by the AM-PAC \"6 clicks\". At this time, Pt. presents with decreased balance, impaired strength, difficulty with gait/transfers resulting in downgrade of overall functional mobility.   Due to above deficits, Presenting Problem: s/p left TKA 10/11/17[BR.2]    Problem List[BR.1]  Active Problems:    Primary osteoarthritis of left knee[BR.2]      Past Medical History[BR.1]   Past Medical History:   Diagnosis Date   • Deviated nasal septum     with NTH   • Dyslipi -   Need to walk in hospital room?: A Little   -   Climbing 3-5 steps with a railing?: A Little[BR. 2]       AM-PAC Score:[BR.1]  Raw Score: 18   PT Approx Degree of Impairment Score: 46.58%   Standardized Score (AM-PAC Scale): 43.63   CMS Modifier (G-Code)  was present yes.  is a     Knee ROM[BR.1]      L Knee Flexion (degrees): 55     L Knee Extension (degrees): 15    Patient End of Session: Up in chair; With [facility] staff;Needs met[BR.2]    ASSESSMENT     Pt seen BID in PT group for gait training, sta Attribution Bardales    BR. 1 - Chiquita Boo, PTA on 10/12/2017 12:49 PM  BR. 2 - Trey Olivares, PTA on 10/12/2017  2:45 PM               Physical Therapy Note signed by Carmelo Gu PT at 10/12/2017  9:52 AM  Version 1 of 1    Author:  Janay Howard 7/11/2015: Medhat Jimenez N/A      Comment: Procedure: COLONOSCOPY, POSSIBLE BIOPSY,                POSSIBLE POLYPECTOMY 48359;  Surgeon: Althea Andrew MD;  Location: [address] [place] [address]  [facility]  Pt is very active with yoga, did yoga one day prior to surgery. SUBJECTIVE  Pt speaking little, does understand [place], but speaks primarily [place].   Son present and translating as needed, but pt able to follow commands given in [facility]\"" -   Climbing 3-5 steps with a railing?: A Lot       AM-PAC Score:  Raw Score: 17   PT Approx Degree of Impairment Score: 50.57%   Standardized Score (AM-PAC Scale): 42.13   CMS Modifier (G-Code): CK    FUNCTIONAL ABILITY STATUS  Gait Assessment   Gait Assi AM-PAC \"6 clicks\" Inpatient Daily Mobility Short Form for the patient is 51% degree of basic mobility impairment. Based on this evaluation, patient's clinical presentation is evolving and overall the evaluation complexity is considered moderate.   Thes Order received for PT eval. Attempted to see Pt x 2 this afternoon, however, Pt continues to present with L LE numbness. Pt not currently appropriate for therapy. Will follow up 10/12/17. [KW.1]      Attribution [facility]ales    KW.1 - Yesi Russell, PT on 10/11 cancer screening  7/11/2015: COLONOSCOPY,BIOPSY      Comment: Procedure: ;  Surgeon: Guru Preston MD;                 Location: [address] [place] [address]  7/11/2015: Zoe Crenshaw N/A      Comment: Procedure: COLONOSCOPY, POS L Lower Extremity: Weight Bearing as Tolerated    PAIN ASSESSMENT  Ratin  Location:  (L knee)  Management Techniques: Relaxation;Repositioning     ACTIVITY TOLERANCE[MM.1]  On RA, no SOB[MM. 3]    ACTIVITIES OF DAILY LIVING ASSESSMENT  AM-PAC ‘6-C OT Treatment Plan: Energy conservation/work simplification techniques;ADL training;Functional transfer training; Endurance training;Patient/Family education;Patient/Family training; Compensatory technique education  Rehab Potential : Good  Frequency (Obs): D Normal nuc stress test in 3/2013   • Osteoarthritis    • Osteoarthritis of knees, bilateral    • Osteopenia     [facility]   • Rhinitis chronic    • Wears dentures     and some implanted teeth       Past Surgical History  Past Surgical History:  8/4/2010  [facility]: Location: [address] [place] [address]  7/11/2015: PATIENT [place] PREOPERATIVE ORDER FOR IV ANT*      Comment: Procedure: ;  Surgeon: Radha Dominguez MD;                 Location: [address] [place] [address]  4/26/2011: REPAIR OF NASAL SEPTUM ACTIVITIES OF DAILY LIVING ASSESSMENT  AM-PAC ‘6-Clicks’ Inpatient Daily Activity Short Form  How much help from another person does the patient currently need…  -   Putting on and taking off regular lower body clothing?: A Lot  -   Bathing (including wa presents with the following performance deficits: decreased balance, activity tolerance, knowledge of adaptive equipment and techniques,  These deficits impact the patient’s ability to participate in self care,  instrumental activities of daily living, res Patient will transfer to toilet:  with supervision and with good judgement/safety[MM. 1]         Attribution Bardales    MM. 1 - Giorgi May, OT on 10/12/2017 12:02 PM  MM. 2 - Giorgi May, OT on 10/12/2017 12:18 PM                     [facility]